# Patient Record
Sex: MALE | Race: WHITE | NOT HISPANIC OR LATINO
[De-identification: names, ages, dates, MRNs, and addresses within clinical notes are randomized per-mention and may not be internally consistent; named-entity substitution may affect disease eponyms.]

---

## 2017-01-23 ENCOUNTER — APPOINTMENT (OUTPATIENT)
Dept: DERMATOLOGY | Facility: CLINIC | Age: 72
End: 2017-01-23

## 2017-01-29 ENCOUNTER — TRANSCRIPTION ENCOUNTER (OUTPATIENT)
Age: 72
End: 2017-01-29

## 2017-12-17 ENCOUNTER — RESULT REVIEW (OUTPATIENT)
Age: 72
End: 2017-12-17

## 2017-12-18 ENCOUNTER — APPOINTMENT (OUTPATIENT)
Dept: DERMATOLOGY | Facility: CLINIC | Age: 72
End: 2017-12-18
Payer: MEDICARE

## 2017-12-18 PROCEDURE — 99213 OFFICE O/P EST LOW 20 MIN: CPT | Mod: 25

## 2017-12-18 PROCEDURE — 11100 BX SKIN SUBCUTANEOUS&/MUCOUS MEMBRANE 1 LESION: CPT

## 2018-02-16 ENCOUNTER — MEDICATION RENEWAL (OUTPATIENT)
Age: 73
End: 2018-02-16

## 2018-05-03 ENCOUNTER — RECORD ABSTRACTING (OUTPATIENT)
Age: 73
End: 2018-05-03

## 2018-05-03 DIAGNOSIS — Z85.46 PERSONAL HISTORY OF MALIGNANT NEOPLASM OF PROSTATE: ICD-10-CM

## 2018-05-07 ENCOUNTER — APPOINTMENT (OUTPATIENT)
Dept: CARDIOLOGY | Facility: CLINIC | Age: 73
End: 2018-05-07
Payer: MEDICARE

## 2018-05-07 VITALS
HEART RATE: 52 BPM | DIASTOLIC BLOOD PRESSURE: 90 MMHG | WEIGHT: 180 LBS | RESPIRATION RATE: 16 BRPM | SYSTOLIC BLOOD PRESSURE: 130 MMHG | BODY MASS INDEX: 30.73 KG/M2 | HEIGHT: 64 IN

## 2018-05-07 DIAGNOSIS — Z82.49 FAMILY HISTORY OF ISCHEMIC HEART DISEASE AND OTHER DISEASES OF THE CIRCULATORY SYSTEM: ICD-10-CM

## 2018-05-07 DIAGNOSIS — Z78.9 OTHER SPECIFIED HEALTH STATUS: ICD-10-CM

## 2018-05-07 PROCEDURE — 99214 OFFICE O/P EST MOD 30 MIN: CPT

## 2018-05-07 PROCEDURE — 93000 ELECTROCARDIOGRAM COMPLETE: CPT

## 2018-05-14 ENCOUNTER — CLINICAL ADVICE (OUTPATIENT)
Age: 73
End: 2018-05-14

## 2018-07-27 ENCOUNTER — MEDICATION RENEWAL (OUTPATIENT)
Age: 73
End: 2018-07-27

## 2018-07-30 ENCOUNTER — MEDICATION RENEWAL (OUTPATIENT)
Age: 73
End: 2018-07-30

## 2018-07-30 RX ORDER — LEVOTHYROXINE SODIUM 0.12 MG/1
125 TABLET ORAL DAILY
Qty: 90 | Refills: 3 | Status: ACTIVE | COMMUNITY
Start: 1900-01-01 | End: 1900-01-01

## 2018-08-01 ENCOUNTER — MEDICATION RENEWAL (OUTPATIENT)
Age: 73
End: 2018-08-01

## 2018-08-01 RX ORDER — ALFUZOSIN HYDROCHLORIDE 10 MG/1
10 TABLET, EXTENDED RELEASE ORAL
Qty: 270 | Refills: 3 | Status: DISCONTINUED | COMMUNITY
End: 2018-08-01

## 2018-08-07 ENCOUNTER — RX RENEWAL (OUTPATIENT)
Age: 73
End: 2018-08-07

## 2018-09-17 ENCOUNTER — APPOINTMENT (OUTPATIENT)
Dept: CARDIOLOGY | Facility: CLINIC | Age: 73
End: 2018-09-17
Payer: MEDICARE

## 2018-09-17 PROCEDURE — 78452 HT MUSCLE IMAGE SPECT MULT: CPT

## 2018-09-17 PROCEDURE — 93015 CV STRESS TEST SUPVJ I&R: CPT

## 2018-09-17 PROCEDURE — A9500: CPT

## 2018-09-17 RX ORDER — REGADENOSON 0.08 MG/ML
0.4 INJECTION, SOLUTION INTRAVENOUS
Qty: 1 | Refills: 0 | Status: COMPLETED | OUTPATIENT
Start: 2018-09-17

## 2018-09-17 RX ADMIN — REGADENOSON 0 MG/5ML: 0.08 INJECTION, SOLUTION INTRAVENOUS at 00:00

## 2018-09-18 RX ORDER — KIT FOR THE PREPARATION OF TECHNETIUM TC99M SESTAMIBI 1 MG/5ML
INJECTION, POWDER, LYOPHILIZED, FOR SOLUTION PARENTERAL
Refills: 0 | Status: COMPLETED | OUTPATIENT
Start: 2018-09-18

## 2018-09-18 RX ADMIN — KIT FOR THE PREPARATION OF TECHNETIUM TC99M SESTAMIBI 0: 1 INJECTION, POWDER, LYOPHILIZED, FOR SOLUTION PARENTERAL at 00:00

## 2018-09-24 ENCOUNTER — APPOINTMENT (OUTPATIENT)
Dept: CARDIOLOGY | Facility: CLINIC | Age: 73
End: 2018-09-24
Payer: MEDICARE

## 2018-09-24 PROCEDURE — 93880 EXTRACRANIAL BILAT STUDY: CPT

## 2018-09-24 PROCEDURE — 93306 TTE W/DOPPLER COMPLETE: CPT

## 2018-10-08 ENCOUNTER — APPOINTMENT (OUTPATIENT)
Dept: CARDIOLOGY | Facility: CLINIC | Age: 73
End: 2018-10-08
Payer: MEDICARE

## 2018-10-08 VITALS
DIASTOLIC BLOOD PRESSURE: 75 MMHG | HEIGHT: 64 IN | WEIGHT: 175 LBS | RESPIRATION RATE: 18 BRPM | HEART RATE: 60 BPM | BODY MASS INDEX: 29.88 KG/M2 | SYSTOLIC BLOOD PRESSURE: 120 MMHG

## 2018-10-08 PROCEDURE — 99214 OFFICE O/P EST MOD 30 MIN: CPT

## 2018-10-08 PROCEDURE — 93000 ELECTROCARDIOGRAM COMPLETE: CPT

## 2019-04-01 ENCOUNTER — NON-APPOINTMENT (OUTPATIENT)
Age: 74
End: 2019-04-01

## 2019-04-01 ENCOUNTER — APPOINTMENT (OUTPATIENT)
Dept: CARDIOLOGY | Facility: CLINIC | Age: 74
End: 2019-04-01
Payer: MEDICARE

## 2019-04-01 VITALS
SYSTOLIC BLOOD PRESSURE: 130 MMHG | WEIGHT: 180 LBS | RESPIRATION RATE: 16 BRPM | BODY MASS INDEX: 30.73 KG/M2 | HEART RATE: 54 BPM | DIASTOLIC BLOOD PRESSURE: 80 MMHG | HEIGHT: 64 IN

## 2019-04-01 PROCEDURE — 93000 ELECTROCARDIOGRAM COMPLETE: CPT

## 2019-04-01 PROCEDURE — 99214 OFFICE O/P EST MOD 30 MIN: CPT

## 2019-04-01 NOTE — PHYSICAL EXAM
[FreeTextEntry1] :                    Well appearing and nourished with moderate alopecia, mild obesity  no obvious deformities or distress.\par \par Eyes: \par No conjunctival injection and no xanthelasmas.\par HEENT: \par Normocephalic.Normal oral mucosa. No pallor or cyanosis\par Neck: \par No jugular venous distension. with normal A and V wave forms. No palpable adenopathy.\par Cardiovascular: \par Normal rate and rhythm with normal S1, S2 and a grade 3/6 systolic murmur. Distal arterial pulses are normal. No significant peripheral edema.\par Pulmonary: \par Lungs are clear to auscultation and percussion. Normal respiratory pattern without any accessory muscle use\par Abdomen: \par Soft, non-tender ; no palpable organomegaly or masses.\par Extremities:\par No digital clubbing, cyanosis or ischemic changes.\par Skin: \par No skin lesions, rashes, ulcers or xanthomas.\par Psychiatric: \par Alert and oriented to person, place and time. Appropriate mood and affect.

## 2019-04-01 NOTE — ASSESSMENT
[FreeTextEntry1] : ECG: Sinus at 54 beats per minute. Left anterior fascicular block. Poor R wave progression. No acute ST or T wave changes.\par \par Lab data 3/10/19:\par Cholesterol 159\par HDL 57\par LDL 78\par A1c 6.0\par Alkaline phosphatase 160\par \par Carotid study 9/20/18:\par Mild bilateral atherosclerotic plaquing. No hemodynamically significant stenosis. Left slightly worse than right.\par \par Echocardiogram 9/24/18:\par LVH with hyperdynamic left ventricular function. Ejection fraction greater than 70%.\par Mild subaortic outflow tract obstruction.\par Mild mitral and tricuspid insufficiency which appears less than on the prior echocardiogram.\par \par Exercise stress test performed with pharmacologic protocol due to bad knees:\par No significant areas of hypoperfusion or ischemia noted.\par \par \par IMPRESSION:\par 1.	Blood pressure is well controlled.\par 2.	No ischemic symptoms nor any evidence of ischemia seen on a pharmacologic stress test 4 years status post CABG. .\par 3.	Mild bilateral atherosclerotic carotid disease unchanged from prior.\par 4.             Rare clinical recurrence of paroxysmal atrial fibrillation on low dose sotalol\par 5.             Lipid control is fine, but slight increase in Alk Phos noted\par \par Plan:\par 1. Encourage continuation of her exercise and appropriate dietary restraints.\par 2. Labs in 4 months\par 3. Six-month followup is recommended\par will try to get a full set of blood work \par

## 2019-04-01 NOTE — HISTORY OF PRESENT ILLNESS
[FreeTextEntry1] : His cardiac history includes:\par 1.	CABG x3 in 2014.\par 2.	Hypertension.\par 3.	Hyperlipidemia.\par \par Further medical history includes:\par 1.	GERD.\par 2.	Prostate cancer.\par 3.	Ulcerative colitis.\par 4.	Bariatric surgery.\par 5.	The PMR.\par

## 2019-04-01 NOTE — REASON FOR VISIT
[FreeTextEntry1] :  The patient presents here for cardiac reevaluation.  His major medical problem at this time has been PMR.\par Still requiring low dose steroid therapy for this.  His leg problem has improved but he is still having some weakness and pain in his arms.    It does not impact on his activity level.  He denies any shortness of breath,  PND, orthopnea, or edema.\par \par Has very rare short episodes of PAF\par \par \par

## 2019-04-01 NOTE — REVIEW OF SYSTEMS
[see HPI] : see HPI [Negative] : Heme/Lymph [Shortness Of Breath] : no shortness of breath [Lower Ext Edema] : no extremity edema [Palpitations] : no palpitations

## 2019-06-03 ENCOUNTER — RX RENEWAL (OUTPATIENT)
Age: 74
End: 2019-06-03

## 2019-08-19 ENCOUNTER — RX RENEWAL (OUTPATIENT)
Age: 74
End: 2019-08-19

## 2019-08-22 ENCOUNTER — MEDICATION RENEWAL (OUTPATIENT)
Age: 74
End: 2019-08-22

## 2019-09-16 ENCOUNTER — APPOINTMENT (OUTPATIENT)
Dept: CARDIOLOGY | Facility: CLINIC | Age: 74
End: 2019-09-16
Payer: MEDICARE

## 2019-09-16 ENCOUNTER — NON-APPOINTMENT (OUTPATIENT)
Age: 74
End: 2019-09-16

## 2019-09-16 VITALS
DIASTOLIC BLOOD PRESSURE: 70 MMHG | WEIGHT: 178 LBS | BODY MASS INDEX: 30.39 KG/M2 | OXYGEN SATURATION: 98 % | HEIGHT: 64 IN | HEART RATE: 54 BPM | RESPIRATION RATE: 16 BRPM | SYSTOLIC BLOOD PRESSURE: 125 MMHG

## 2019-09-16 PROCEDURE — 99214 OFFICE O/P EST MOD 30 MIN: CPT

## 2019-09-16 PROCEDURE — 93000 ELECTROCARDIOGRAM COMPLETE: CPT

## 2019-09-16 NOTE — REASON FOR VISIT
[FreeTextEntry1] :  The patient presents here for cardiac reevaluation. \par \par No clinical occurrence of Afib\par \par  His major medical problem at this time has been PMR.Still requiring low dose steroid therapy for this. \par \par Complaints of frequent bruising due to AC therapy.\par \par His leg problem has improved but he is still having some weakness and pain in his arms.    It does not impact on his activity level and states he continues to be fairly active and exercise with no exertional discomfort.\par \par S/P CABG 5 years ago\par \par He denies any shortness of breath, palpitations,  PND, orthopnea, or edema.\par \par \par \par \par

## 2019-09-16 NOTE — ASSESSMENT
[FreeTextEntry1] : ECG: Sinus zoe  beats per minute. Poor R wave progression, Non specific T wave abnormalities.\par \par Lab data 6/9/2019 (ordered by Dr. Sam Lyon)\par Chol. 161\par HDL 53\par LDL 78\par \par A1C 5.9\par Creat.0.9\par ALK phos 186\par HGB 12.8\par \par \par Lab data 3/10/19:\par Cholesterol 159\par HDL 57\par LDL 78\par A1c 6.0\par Alkaline phosphatase 160\par \par Carotid study 9/20/18:\par Mild bilateral atherosclerotic plaquing. No hemodynamically significant stenosis. Left slightly worse than right.\par \par Echocardiogram 9/24/18:\par LVH with hyperdynamic left ventricular function. Ejection fraction greater than 70%.\par Mild subaortic outflow tract obstruction.\par Mild mitral and tricuspid insufficiency which appears less than on the prior echocardiogram.\par \par Exercise stress test performed with pharmacologic protocol due to bad knees:\par No significant areas of hypoperfusion or ischemia noted.\par \par \par IMPRESSION:\par 1.	Blood pressure is well controlled, today 125/70 \par 2.	No ischemic symptoms nor any evidence of ischemia seen on a pharmacologic stress test ( pharm. due                   to bad knees) 5years  status post CABG. .\par 3.	Mild bilateral atherosclerotic carotid disease unchanged from prior.\par 4.             Rare clinical recurrence of paroxysmal atrial fibrillation on low dose sotalol\par 5.             Lipid control is fine, overall goal for LDL  is < 70. Continued increase in ALK phos of 186, was 160                            in March noted.\par \par Plan:\par 1. Encourage continuation of his exercise and appropriate dietary restraints.\par 2. No changes in management or cardiac testing is needed at this time \par 3. Follow up BW in January 2020\par \par Clinical follow up in  6 months \par

## 2020-02-03 ENCOUNTER — APPOINTMENT (OUTPATIENT)
Dept: DERMATOLOGY | Facility: CLINIC | Age: 75
End: 2020-02-03
Payer: MEDICARE

## 2020-02-03 PROCEDURE — 17000 DESTRUCT PREMALG LESION: CPT

## 2020-02-03 PROCEDURE — 99213 OFFICE O/P EST LOW 20 MIN: CPT | Mod: 25

## 2020-03-13 NOTE — REVIEW OF SYSTEMS
[Shortness Of Breath] : no shortness of breath [Lower Ext Edema] : no extremity edema [Palpitations] : no palpitations [see HPI] : see HPI [Negative] : Heme/Lymph

## 2020-03-13 NOTE — ASSESSMENT
[FreeTextEntry1] : ECG: Sinus zoe  beats per minute. Poor R wave progression, Non specific T wave abnormalities.\par \par Lab Data 2020\par Chol: 161\par HDL: 60\par LDL: 72\par Tr\par A1c: 6.2\par Creat: 0.94\par \par Lab data 2019 (ordered by Dr. Sam Lyon)\par Chol. 161\par HDL 53\par LDL 78\par \par A1C 5.9\par Creat.0.9\par ALK phos 186\par HGB 12.8\par \par \par Lab data 3/10/19:\par Cholesterol 159\par HDL 57\par LDL 78\par A1c 6.0\par Alkaline phosphatase 160\par \par Carotid study 18:\par Mild bilateral atherosclerotic plaquing. No hemodynamically significant stenosis. Left slightly worse than right.\par \par Echocardiogram 18:\par LVH with hyperdynamic left ventricular function. Ejection fraction greater than 70%.\par Mild subaortic outflow tract obstruction.\par Mild mitral and tricuspid insufficiency which appears less than on the prior echocardiogram.\par \par Exercise stress test performed with pharmacologic protocol due to bad knees:\par No significant areas of hypoperfusion or ischemia noted.\par \par \par IMPRESSION:\par 1.	Blood pressure is well controlled, today 125/70 \par 2.	No ischemic symptoms nor any evidence of ischemia seen on a pharmacologic stress test ( pharm. due                   to bad knees) 5years  status post CABG. .\par 3.	Mild bilateral atherosclerotic carotid disease unchanged from prior.\par 4.             Rare clinical recurrence of paroxysmal atrial fibrillation on low dose sotalol\par 5.             Lipid control is fine, overall goal for LDL  is < 70. Continued increase in ALK phos of 186, was 160                            in March noted.\par \par Plan:\par 1. Encourage continuation of his exercise and appropriate dietary restraints.\par 2. No changes in management or cardiac testing is needed at this time \par 3. Follow up BW in 2020\par \par Clinical follow up in  6 months \par

## 2020-03-16 ENCOUNTER — APPOINTMENT (OUTPATIENT)
Dept: CARDIOLOGY | Facility: CLINIC | Age: 75
End: 2020-03-16

## 2020-08-24 ENCOUNTER — NON-APPOINTMENT (OUTPATIENT)
Age: 75
End: 2020-08-24

## 2020-08-24 ENCOUNTER — APPOINTMENT (OUTPATIENT)
Dept: CARDIOLOGY | Facility: CLINIC | Age: 75
End: 2020-08-24
Payer: MEDICARE

## 2020-08-24 VITALS
OXYGEN SATURATION: 98 % | TEMPERATURE: 98.3 F | WEIGHT: 176 LBS | SYSTOLIC BLOOD PRESSURE: 158 MMHG | HEART RATE: 53 BPM | BODY MASS INDEX: 30.05 KG/M2 | DIASTOLIC BLOOD PRESSURE: 82 MMHG | HEIGHT: 64 IN | RESPIRATION RATE: 16 BRPM

## 2020-08-24 PROCEDURE — 93000 ELECTROCARDIOGRAM COMPLETE: CPT

## 2020-08-24 PROCEDURE — 99214 OFFICE O/P EST MOD 30 MIN: CPT

## 2020-08-24 NOTE — HISTORY OF PRESENT ILLNESS
[FreeTextEntry1] : His cardiac history includes:\par 1.	CABG x  3 in 2014.\par 2.	Hypertension.\par 3.	Hyperlipidemia.\par \par Further medical history includes:\par 1.	GERD.\par 2.	Prostate cancer.\par 3.	Ulcerative colitis.\par 4.	Bariatric surgery.\par 5.	PMR.\par

## 2020-08-24 NOTE — ASSESSMENT
[FreeTextEntry1] : ECG: Sinus zoe  53 beats per minute. Poor R wave progression, Non specific T wave abnormalities.\par \par Lab data 6/9/2019 (ordered by Dr. Sam Lyon)\par Chol. 161\par HDL    53\par LDL    78\par \par A1C 5.9\par Creat.0.9\par ALK phos 186\par HGB 12.8\par \par \par Lab data 3/10/19:\par Cholesterol 159\par HDL 57\par LDL 78\par A1c 6.0\par Alkaline phosphatase 160\par \par Carotid study 9/20/18:\par Mild bilateral atherosclerotic plaquing. No hemodynamically significant stenosis. Left slightly worse than right.\par \par Echocardiogram 9/24/18:\par LVH with hyperdynamic left ventricular function. Ejection fraction greater than 70%.\par Mild subaortic outflow tract obstruction.\par Mild mitral and tricuspid insufficiency which appears less than on the prior echocardiogram.\par \par Exercise stress test performed with pharmacologic protocol due to bad knees:\par No significant areas of hypoperfusion or ischemia noted.\par \par \par IMPRESSION:\par 1.	Blood pressure is usually well controlled \par 2.	No ischemic symptoms nor any evidence of ischemia seen on a pharmacologic stress test ( pharm. due                   to bad knees) 5  years  status post CABG. .\par 3.	Mild bilateral atherosclerotic carotid disease unchanged from prior.\par 4.             Rare clinical recurrence of paroxysmal atrial fibrillation on low dose sotalol\par 5.             Lipid control is fine, overall goal for LDL  is < 70. \par \par Plan:\par 1. Will arrange for a followup carotid duplex to be done to reassess progression of disease.\par \par 2. Patient will undergo a repeat pharmacologic stress test to reassess myocardial perfusion 6 years post CABG.\par \par 3. Follow up BW in January 2020\par \par Clinical follow up in  6 months \par

## 2020-08-24 NOTE — REASON FOR VISIT
[FreeTextEntry1] :  The patient presents here for cardiac reevaluation. \par \par No clinical occurrence of Afib\par \par  His major medical problem at this time has been PMR.Still requiring low dose steroid therapy for this. \par \par Complaints of frequent bruising due to AC therapy.\par \par His leg problem has improved but he is still having some weakness and pain in his arms.    It does impact on his activity level\par \par S/P CABG 6  years ago\par \par He denies any shortness of breath, palpitations,  PND, orthopnea, or edema.\par \par Patient realizes that he's been considerably less active in recent months mostly as a result of his PMR and knee arthritis. This has consequently led to a decrease in his functional capacity.\par \par

## 2020-10-26 ENCOUNTER — APPOINTMENT (OUTPATIENT)
Dept: CARDIOLOGY | Facility: CLINIC | Age: 75
End: 2020-10-26
Payer: MEDICARE

## 2020-10-26 PROCEDURE — A9500: CPT

## 2020-10-26 PROCEDURE — 93015 CV STRESS TEST SUPVJ I&R: CPT

## 2020-10-26 PROCEDURE — 78452 HT MUSCLE IMAGE SPECT MULT: CPT

## 2020-10-26 RX ORDER — REGADENOSON 0.08 MG/ML
0.4 INJECTION, SOLUTION INTRAVENOUS
Qty: 1 | Refills: 0 | Status: COMPLETED | OUTPATIENT
Start: 2020-10-26

## 2020-10-26 RX ORDER — AMINOPHYLLINE 25 MG/ML
25 INJECTION, SOLUTION INTRAVENOUS
Qty: 0 | Refills: 0 | Status: COMPLETED | OUTPATIENT
Start: 2020-10-26

## 2020-10-26 RX ADMIN — AMINOPHYLLINE 3 MG/ML: 25 INJECTION, SOLUTION INTRAVENOUS at 00:00

## 2020-10-26 RX ADMIN — REGADENOSON 0 MG/5ML: 0.08 INJECTION, SOLUTION INTRAVENOUS at 00:00

## 2020-10-30 ENCOUNTER — APPOINTMENT (OUTPATIENT)
Dept: CARDIOLOGY | Facility: CLINIC | Age: 75
End: 2020-10-30
Payer: MEDICARE

## 2020-10-30 PROCEDURE — 93880 EXTRACRANIAL BILAT STUDY: CPT

## 2020-11-04 RX ORDER — KIT FOR THE PREPARATION OF TECHNETIUM TC99M SESTAMIBI 1 MG/5ML
INJECTION, POWDER, LYOPHILIZED, FOR SOLUTION PARENTERAL
Refills: 0 | Status: COMPLETED | OUTPATIENT
Start: 2020-11-04

## 2020-11-04 RX ADMIN — KIT FOR THE PREPARATION OF TECHNETIUM TC99M SESTAMIBI 0: 1 INJECTION, POWDER, LYOPHILIZED, FOR SOLUTION PARENTERAL at 00:00

## 2020-11-06 ENCOUNTER — APPOINTMENT (OUTPATIENT)
Dept: DERMATOLOGY | Facility: CLINIC | Age: 75
End: 2020-11-06

## 2020-12-14 ENCOUNTER — APPOINTMENT (OUTPATIENT)
Dept: CARDIOLOGY | Facility: CLINIC | Age: 75
End: 2020-12-14
Payer: MEDICARE

## 2020-12-14 ENCOUNTER — NON-APPOINTMENT (OUTPATIENT)
Age: 75
End: 2020-12-14

## 2020-12-14 VITALS
WEIGHT: 180 LBS | TEMPERATURE: 97.6 F | DIASTOLIC BLOOD PRESSURE: 82 MMHG | HEIGHT: 64 IN | HEART RATE: 56 BPM | OXYGEN SATURATION: 97 % | RESPIRATION RATE: 16 BRPM | BODY MASS INDEX: 30.73 KG/M2 | SYSTOLIC BLOOD PRESSURE: 138 MMHG

## 2020-12-14 DIAGNOSIS — K21.9 GASTRO-ESOPHAGEAL REFLUX DISEASE W/OUT ESOPHAGITIS: ICD-10-CM

## 2020-12-14 PROCEDURE — 93000 ELECTROCARDIOGRAM COMPLETE: CPT

## 2020-12-14 PROCEDURE — 99214 OFFICE O/P EST MOD 30 MIN: CPT

## 2020-12-14 NOTE — ASSESSMENT
[FreeTextEntry1] : ECG: Sinus zoe  56 beats per minute. Poor R wave progression, Non specific T wave abnormalities.\par \par \par Lab data 12/2/20\par Chol 155\par LDL   79\par HDL   47\par A1C   6.1\par Creat. 0.92\par \par Lab data 6/9/2019 (ordered by Dr. Sam Lyon)\par Chol. 161\par HDL    53\par LDL    78\par \par A1C 5.9\par Creat.0.9\par ALK phos 186\par HGB 12.8\par \par Carotid study 10/30/20\par Mild plaque in the ICA and prox. ICA\par Moderate plaque in the left ECA\par Mild plaque in the distal right CCA\par Mild plaque in the right bulb\par Mild plaque in the right prox ICA\par \par Carotid study 9/20/18:\par Mild bilateral atherosclerotic plaquing. No hemodynamically significant stenosis. Left slightly worse than right.\par \par Pharm. stress test 10/26/20\par Peal HR 68 bpm\par Peak /70\par Pcc. PACs, ECG negative for ischemia.\par Normal SPECT.\par EF 62%\par \par Echocardiogram 9/24/18:\par LVH with hyperdynamic left ventricular function. Ejection fraction greater than 70%.\par Mild subaortic outflow tract obstruction.\par Mild mitral and tricuspid insufficiency which appears less than on the prior echocardiogram.\par \par IMPRESSION:\par 1.	Blood pressure controlled. \par \par 2.	No ischemic symptoms nor any evidence of ischemia seen on a pharmacologic stress test ( pharm. due                   to bad knees) 6  years  status post CABG. .\par \par 3.	Mild bilateral atherosclerotic carotid disease unchanged from prior, no obstruction.\par \par 4.             Rare clinical recurrence of paroxysmal atrial fibrillation on low dose sotalol. ECG today NSR\par \par 5.             Lipid control is adequate, overall goal for LDL  is < 70. \par \par 6.             No cardiac symptoms at rest or with exertion. \par \par 7.             A1c trending up, now 6.1 was  5.9.\par \par Plan:\par 1.Repeat BW through his PCP.\par \par 2.Exercise has been encouraged.\par \par 3.Watch diet. \par \par 4. Meds have been refilled. \par \par Clinical follow up in  6 months \par

## 2020-12-14 NOTE — REVIEW OF SYSTEMS
[see HPI] : see HPI [Negative] : Heme/Lymph [Recent Weight Gain (___ Lbs)] : recent [unfilled] ~Ulb weight gain [Shortness Of Breath] : no shortness of breath [Lower Ext Edema] : no extremity edema [Palpitations] : no palpitations

## 2020-12-23 ENCOUNTER — RESULT CHARGE (OUTPATIENT)
Age: 75
End: 2020-12-23

## 2021-05-24 ENCOUNTER — APPOINTMENT (OUTPATIENT)
Dept: CARDIOLOGY | Facility: CLINIC | Age: 76
End: 2021-05-24
Payer: MEDICARE

## 2021-05-24 VITALS
HEART RATE: 55 BPM | BODY MASS INDEX: 30.39 KG/M2 | DIASTOLIC BLOOD PRESSURE: 70 MMHG | OXYGEN SATURATION: 97 % | WEIGHT: 178 LBS | RESPIRATION RATE: 16 BRPM | HEIGHT: 64 IN | SYSTOLIC BLOOD PRESSURE: 120 MMHG | TEMPERATURE: 97.3 F

## 2021-05-24 PROCEDURE — 93000 ELECTROCARDIOGRAM COMPLETE: CPT

## 2021-05-24 PROCEDURE — 99214 OFFICE O/P EST MOD 30 MIN: CPT

## 2021-05-24 NOTE — REASON FOR VISIT
[FreeTextEntry1] : The patient presents here for cardiac reevaluation. \par \par No recent recorded AFIB but does experience palps  on rare occasion associated with mild SOB. \par \par Mentions l/e edema from the shin down, usually  in the evening which resolves in the morning. Tries to avoid processed foods, does admit to occ. salty snacks. There is no edema on exam today.\par \par His major medical problem at this time has been PMR.Still requiring low dose steroid therapy for this. \par Does not exercise but keeps active with hunting. Limited by bad knees,\par \par S/P CABG x 3  2014\par .He denies any shortness of breath, palpitations,  PND, orthopnea, or edema.\par \par

## 2021-05-24 NOTE — ASSESSMENT
[FreeTextEntry1] : ECG: Sinus zoe  55  beats per minute. Poor R wave progression, Non specific T wave abnormalities.\par \par \par Lab data \par       12/2/20    5/15/21\par Chol 155           155\par LDL   79             86\par HDL   47            46\par A1C   6.1\par Creat. 0.92\par \par Lab data 6/9/2019 (ordered by Dr. Sam Lyon)\par Chol. 161\par HDL    53\par LDL    78\par \par A1C 5.9\par Creat.0.9\par ALK phos 186\par HGB 12.8\par \par Carotid study 10/30/20\par Mild plaque in the ICA and prox. ICA\par Moderate plaque in the left ECA\par Mild plaque in the distal right CCA\par Mild plaque in the right bulb\par Mild plaque in the right prox ICA\par \par Carotid study 9/20/18:\par Mild bilateral atherosclerotic plaquing. No hemodynamically significant stenosis. Left slightly worse than right.\par \par Pharm. stress test 10/26/20\par Peal HR 68 bpm\par Peak /70\par Pcc. PACs, ECG negative for ischemia.\par Normal SPECT.\par EF 62%\par \par Echocardiogram 9/24/18:\par LVH with hyperdynamic left ventricular function. Ejection fraction greater than 70%.\par Mild subaortic outflow tract obstruction.\par Mild mitral and tricuspid insufficiency which appears less than on the prior echocardiogram.\par \par IMPRESSION:\par 1.	Blood pressure controlled.  BMI appropriate for his age/ht/wt.\par \par 2.	No ischemic symptoms nor any evidence of ischemia seen on a pharmacologic stress test ( pharm. due                   to bad knees) 6  years  status post CABG. .\par \par 3.	Mild bilateral atherosclerotic carotid disease unchanged from prior, no obstruction.\par \par 4.             Rare clinical recurrence of palps c/w his hx of paroxysmal atrial fibrillation on low dose sotalol. ECG                 today NSR\par \par 5.             Lipid control is adequate, overall goal for LDL  is < 70. \par \par 6.             No cardiac symptoms at rest or with exertion. \par \par 7.             Last  A1c trending up and aware.\par \par 8.             No signs of HF on exam. Episodic l/e edema due to salty snacks. Currently on a very low dose CCB. \par \par Plan:\par 1.Repeat BW through his PCP.\par \par 2.Exercise has been encouraged.\par \par 3.Watch diet. Instructed to avoid Na+ containing foods for the next week to see if this resolves edema. \par \par 4. No changes to meds. \par \par Clinical follow up in  6 months \par

## 2021-05-24 NOTE — REVIEW OF SYSTEMS
[Weight Loss (___ Lbs)] : [unfilled] ~Ulb weight loss [Weight Gain (___ Lbs)] : no recent weight gain [FreeTextEntry2] : Other than as documented here and in the HPI, the thirteen point ROS is negative

## 2021-09-03 ENCOUNTER — NON-APPOINTMENT (OUTPATIENT)
Age: 76
End: 2021-09-03

## 2021-09-07 RX ORDER — AMLODIPINE BESYLATE 2.5 MG/1
2.5 TABLET ORAL TWICE DAILY
Qty: 180 | Refills: 1 | Status: DISCONTINUED | COMMUNITY
Start: 2020-08-24 | End: 2021-09-07

## 2021-09-09 ENCOUNTER — APPOINTMENT (OUTPATIENT)
Dept: CARDIOLOGY | Facility: CLINIC | Age: 76
End: 2021-09-09
Payer: MEDICARE

## 2021-09-09 VITALS
BODY MASS INDEX: 30.9 KG/M2 | WEIGHT: 181 LBS | HEIGHT: 64 IN | SYSTOLIC BLOOD PRESSURE: 192 MMHG | DIASTOLIC BLOOD PRESSURE: 110 MMHG | RESPIRATION RATE: 16 BRPM | HEART RATE: 59 BPM | OXYGEN SATURATION: 97 %

## 2021-09-09 PROCEDURE — 93000 ELECTROCARDIOGRAM COMPLETE: CPT

## 2021-09-09 PROCEDURE — 99215 OFFICE O/P EST HI 40 MIN: CPT

## 2021-09-09 NOTE — REASON FOR VISIT
[FreeTextEntry1] : The patient presents here for cardiac reevaluation regarding recent escalation of blood pressure and some reported edema of the right lower extremity.\par Patient called 9/3/2021 with complaints of burning sensation in the right lower extremity associated with some edema.  In addition, blood pressure was 170/90.\par He was instructed to cut amlodipine to 2.5 once daily.\par Remains edematous and on his own discontinued amlodipine entirely.\par Losartan 50 mg a day was added when he called back 9/7 with persistent elevation of blood pressure 182/84.\par BPs have remained markedly elevated ever since.\par Swelling may be a little better.\par He denies any change in diet or new stresses or discomfort of any sort.\par \par No recent recorded AFIB but does experience palps  on rare occasion associated with mild SOB. \par \par His major medical problem at this time has been PMR.Still requiring low dose steroid therapy for this. \par Does not exercise but keeps active with hunting. Limited by bad knees,\par \par S/P CABG x 3  2014\par .He denies any shortness of breath, palpitations,  PND, orthopnea.\par \par

## 2021-09-09 NOTE — PHYSICAL EXAM
[FreeTextEntry1] :                    Well appearing and nourished with moderate alopecia, mild obesity  no obvious deformities or distress.\par \par Eyes: \par No conjunctival injection and no xanthelasmas.\par HEENT: \par Normocephalic.Normal oral mucosa. No pallor or cyanosis\par Neck: \par No jugular venous distension. with normal A and V wave forms. No palpable adenopathy.\par Cardiovascular: \par Normal rate and rhythm with normal S1, S2 and a grade 3/6 systolic murmur. Distal arterial pulses are normal.  Trace pretibial peripheral edema.\par Pulmonary: \par Lungs are clear to auscultation and percussion. Normal respiratory pattern without any accessory muscle use\par Abdomen: \par Soft, non-tender ; no palpable organomegaly or masses.\par Extremities:\par No digital clubbing, cyanosis or ischemic changes.\par Skin: \par No skin lesions, rashes, ulcers or xanthomas.\par Psychiatric: \par Alert and oriented to person, place and time. Appropriate mood and affect.

## 2021-09-09 NOTE — REVIEW OF SYSTEMS
[Weight Gain (___ Lbs)] : [unfilled] ~Ulb weight gain [Weight Loss (___ Lbs)] : no recent weight loss [FreeTextEntry2] : Other than as documented here and in the HPI, the thirteen point ROS is negative

## 2021-09-09 NOTE — ASSESSMENT
[FreeTextEntry1] : ECG: Sinus zoe  58   beats per minute. Poor R wave progression, Non specific T wave abnormalities.\par \par \par Lab data \par       12/2/20    5/15/21\par Chol 155           155\par LDL   79             86\par HDL   47            46\par A1C   6.1\par Creat. 0.92\par \par Lab data 6/9/2019 (ordered by Dr. Sam Lyon)\par Chol. 161\par HDL    53\par LDL    78\par \par A1C 5.9\par Creat.0.9\par ALK phos 186\par HGB 12.8\par \par Carotid study 10/30/20\par Mild plaque in the ICA and prox. ICA\par Moderate plaque in the left ECA\par Mild plaque in the distal right CCA\par Mild plaque in the right bulb\par Mild plaque in the right prox ICA\par \par Carotid study 9/20/18:\par Mild bilateral atherosclerotic plaquing. No hemodynamically significant stenosis. Left slightly worse than right.\par \par Pharm. stress test 10/26/20\par Peal HR 68 bpm\par Peak /70\par Pcc. PACs, ECG negative for ischemia.\par Normal SPECT.\par EF 62%\par \par Echocardiogram 9/24/18:\par LVH with hyperdynamic left ventricular function. Ejection fraction greater than 70%.\par Mild subaortic outflow tract obstruction.\par Mild mitral and tricuspid insufficiency which appears less than on the prior echocardiogram.\par \par IMPRESSION:\par 1.	Blood pressure has become markedly uncontrolled.  Uncertain of duration.  Discontinuing amlodipine                 has not contributed significantly as he was 170 systolic before the change.\par \par 2.	No ischemic symptoms nor any evidence of ischemia seen on a pharmacologic stress test ( pharm. due                   to bad knees) 6  years  status post CABG. .\par \par 3.	Mild bilateral atherosclerotic carotid disease unchanged from prior, no obstruction.\par \par 4.             Rare clinical recurrence of palps c/w his hx of paroxysmal atrial fibrillation on low dose sotalol. ECG                 today NSR\par \par 5.             Lipid control is adequate, overall goal for LDL  is < 70. \par \par 6.             No cardiac symptoms at rest or with exertion. \par \par 7.             Last  A1c trending up and aware.\par \par 8.             Complaints of unilateral swelling heat-like pain discomfort on ambulation of several weeks duration.\par \par Plan:\par 1.lower extremity venous duplex rule out DVT\par \par 2.lower extremity arterial duplex possible claudication and PAD with a known history of atherosclerotic disease\par \par 3.Watch diet. Instructed to avoid Na+ containing foods for the next week to see if this resolves edema. \par \par 4.  Increase clonidine from 0.1-0.2 twice daily\par \par 5.  Add hydrochlorothiazide 25 mg daily and obtain a BMP in 2 weeks\par \par 6.  Echocardiogram to reassess for cardiomyopathy pulmonary hypertension or valvular disease\par Clinical follow up in  6 weeks\par

## 2021-09-16 ENCOUNTER — APPOINTMENT (OUTPATIENT)
Dept: CARDIOLOGY | Facility: CLINIC | Age: 76
End: 2021-09-16
Payer: MEDICARE

## 2021-09-16 PROCEDURE — 93306 TTE W/DOPPLER COMPLETE: CPT

## 2021-09-16 PROCEDURE — 93925 LOWER EXTREMITY STUDY: CPT

## 2021-09-23 ENCOUNTER — APPOINTMENT (OUTPATIENT)
Dept: CARDIOLOGY | Facility: CLINIC | Age: 76
End: 2021-09-23
Payer: MEDICARE

## 2021-09-23 PROCEDURE — 93970 EXTREMITY STUDY: CPT

## 2021-10-04 ENCOUNTER — APPOINTMENT (OUTPATIENT)
Dept: CARDIOLOGY | Facility: CLINIC | Age: 76
End: 2021-10-04
Payer: MEDICARE

## 2021-10-04 VITALS
BODY MASS INDEX: 30.73 KG/M2 | RESPIRATION RATE: 16 BRPM | WEIGHT: 180 LBS | DIASTOLIC BLOOD PRESSURE: 70 MMHG | HEIGHT: 64 IN | OXYGEN SATURATION: 98 % | SYSTOLIC BLOOD PRESSURE: 115 MMHG | HEART RATE: 60 BPM

## 2021-10-04 DIAGNOSIS — F41.9 ANXIETY DISORDER, UNSPECIFIED: ICD-10-CM

## 2021-10-04 PROCEDURE — 93000 ELECTROCARDIOGRAM COMPLETE: CPT

## 2021-10-04 PROCEDURE — 99214 OFFICE O/P EST MOD 30 MIN: CPT

## 2021-10-04 NOTE — REASON FOR VISIT
[FreeTextEntry1] : The patient presents here for cardiac reevaluation regarding recent escalation of blood pressure \par \par  9/3/2021 c/of burning sensation in the right lower extremity associated with some edema.  In addition, blood pressure was 170/90.\par He was instructed to cut amlodipine to 2.5 once daily.\par Remains edematous and on his own discontinued amlodipine entirely.\par He called back 9/7 with persistent elevation of blood pressure 182/84.Losartan 50 mg -  added  \par 9/9/21   Increased clonidine from 0.1-0.2 twice daily\par              Rx hydrochlorothiazide 25 mg daily \par 9/23/2021 complained of heart pounding anxiety dizziness shortness of breath.  Blood pressures averaging 129/71\par Began Lexapro 10 mg p.o. daily.\par \par Symptoms of anxiety have resolved.\par Blood pressure now controlled running less than 120/65 on average.\par .\par He denies any change in diet or new stresses or discomfort of any sort.\par \par No recent recorded AFIB but does experience palps  on rare occasion associated with mild SOB. \par \par His major medical problem at this time has been PMR.Still requiring low dose steroid therapy for this. \par Does not exercise but keeps active with hunting. Limited by bad knees,\par \par S/P CABG x 3  2014\par .He denies any shortness of breath, palpitations,  PND, orthopnea.\par \par

## 2021-10-04 NOTE — ASSESSMENT
[FreeTextEntry1] : ECG: Sinus zoe  58   beats per minute. Poor R wave progression, Non specific T wave abnormalities.\par \par \par Lab data \par 9/22/2021:\par BUN 29/creatinine 1.33 increased from 0.94 since adding losartan 50 and HCTZ 25 mg\par \par \par       12/2/20    5/15/21\par Chol 155           155\par LDL   79             86\par HDL   47            46\par A1C   6.1\par Creat. 0.92\par \par Lab data 6/9/2019 (ordered by Dr. Sam Lyon)\par Chol. 161\par HDL    53\par LDL    78\par \par A1C 5.9\par Creat.0.9\par ALK phos 186\par HGB 12.8\par \par Echocardiogram 9/16/2021:\par Normal LV size and function ejection fraction 60 to 65%\par Mild left atrial dilatation\par No significant valvular disease.\par \par Venous duplex lower extremity 9/23/2021:\par No evidence of DVT bilaterally\par \par Lower extremity arterial duplex 9/16/2021:\par No evidence of any significant obstructive PAD.\par \par Carotid study 10/30/20\par Mild plaque in the ICA and prox. ICA\par Moderate plaque in the left ECA\par Mild plaque in the distal right CCA\par Mild plaque in the right bulb\par Mild plaque in the right prox ICA\par \par Carotid study 9/20/18:\par Mild bilateral atherosclerotic plaquing. No hemodynamically significant stenosis. Left slightly worse than right.\par \par Pharm. stress test 10/26/20\par Peal HR 68 bpm\par Peak /70\par Pcc. PACs, ECG negative for ischemia.\par Normal SPECT.\par EF 62%\par \par Echocardiogram 9/24/18:\par LVH with hyperdynamic left ventricular function. Ejection fraction greater than 70%.\par Mild subaortic outflow tract obstruction.\par Mild mitral and tricuspid insufficiency which appears less than on the prior echocardiogram.\par \par IMPRESSION:\par 1.	Blood pressure is now adequately controlled with the changes which have included:\par                 Increase clonidine to 0.2 every 12\par                 Adding losartan 50 mg a day\par                 Adding hydrochlorothiazide 25 mg a day\par                 Lexapro 10 mg a day\par \par 2.	No ischemic symptoms nor any evidence of ischemia seen on a pharmacologic stress test ( pharm. due                   to bad knees) 6  years  status post CABG. .\par \par 3.	Mild bilateral atherosclerotic carotid disease unchanged from prior, no obstruction.\par                  Complaints of unilateral swelling heat-like pain discomfort on ambulation of several weeks duration.\par                  No evidence of any significant lower extremity PAD and the burning in his calf muscles has resolved.\par \par 4.             Rare clinical recurrence of palps c/w his hx of paroxysmal atrial fibrillation on low dose sotalol. \par \par 5.             Lipid control is adequate, overall goal for LDL  is < 70. \par \par 6.             Lower extremity edema has resolved and venous Doppler shows no DVT\par \par 7.             Last  A1c trending up and aware.\par \par           \par Plan:\par 1.  Continuation of all current meds for treatment of hypertension\par \par 2.  We will continue Lexapro 10 mg daily for at least the next 2 months\par \par 3.  Reassured patient about the findings on lower extremity arterial and venous duplex as well as the echocardiogram\par \par 4.  Resumption of regular exercise\par \par 5.  Clinical follow-up in 2 months

## 2021-11-29 ENCOUNTER — RX RENEWAL (OUTPATIENT)
Age: 76
End: 2021-11-29

## 2022-01-12 ENCOUNTER — RESULT CHARGE (OUTPATIENT)
Age: 77
End: 2022-01-12

## 2022-01-13 ENCOUNTER — APPOINTMENT (OUTPATIENT)
Dept: CARDIOLOGY | Facility: CLINIC | Age: 77
End: 2022-01-13
Payer: MEDICARE

## 2022-01-13 VITALS
RESPIRATION RATE: 16 BRPM | DIASTOLIC BLOOD PRESSURE: 90 MMHG | BODY MASS INDEX: 30.56 KG/M2 | WEIGHT: 179 LBS | SYSTOLIC BLOOD PRESSURE: 180 MMHG | HEART RATE: 45 BPM | HEIGHT: 64 IN | OXYGEN SATURATION: 98 %

## 2022-01-13 DIAGNOSIS — E03.9 HYPOTHYROIDISM, UNSPECIFIED: ICD-10-CM

## 2022-01-13 PROCEDURE — 93000 ELECTROCARDIOGRAM COMPLETE: CPT

## 2022-01-13 PROCEDURE — 99214 OFFICE O/P EST MOD 30 MIN: CPT

## 2022-01-13 RX ORDER — SULFASALAZINE 500 MG/1
500 TABLET, DELAYED RELEASE ORAL 3 TIMES DAILY
Qty: 270 | Refills: 0 | Status: DISCONTINUED | COMMUNITY
Start: 2018-08-01 | End: 2022-01-13

## 2022-01-13 NOTE — REASON FOR VISIT
[FreeTextEntry1] : The patient presents here for cardiac reevaluation regarding recent escalation of blood pressure \par Hypertension had been well controlled until early December.\par Noted that after AM meds of carvedilol and clonidine, he would see a sudden spike by noon to 180 systolic\par Then would take losartan 50 mg and BP would come down a bit but remain elevated greater than 150 through most of the rest of the day.  Evening would take the second carvedilol and clonidine.\par Headaches have been associated with the elevated blood pressure.\par Questions whether some of this elevation is related to the transitioning from amlodipine to losartan.\par \par \par  9/3/2021 c/of burning sensation in the right lower extremity associated with some edema.  In addition, blood pressure was 170/90.\par We reduced amlodipine to 2.5 once daily.\par Remains edematous and he discontinued amlodipine entirely.\par He called back 9/7 with persistent elevation of blood pressure 182/84\par Losartan 50 mg -  added  \par 9/9/21   Increased clonidine from 0.1-0.2 twice daily\par              Rx hydrochlorothiazide 25 mg daily \par 9/23/2021 complained of heart pounding anxiety dizziness shortness of breath.  Blood pressures averaging 129/71\par Began Lexapro 10 mg p.o. daily.\par \par Symptoms of anxiety have resolved.\par Blood pressure now controlled running less than 120/65 on average.\par .\par He denies any change in diet or new stresses or discomfort of any sort.\par \par No recent recorded AFIB but does experience palps  on rare occasion associated with mild SOB. \par \par His major medical problem at this time has been PMR.Still requiring low dose steroid therapy for this. \par Does not exercise but keeps active with hunting. Limited by bad knees,\par \par S/P CABG x 3  2014\par .He denies any shortness of breath, palpitations,  PND, orthopnea.\par \par

## 2022-01-13 NOTE — ASSESSMENT
[FreeTextEntry1] : ECG: Sinus zoe  45   beats per minute. Poor R wave progression, Non specific T wave abnormalities.\par \par \par Lab data \par 9/22/2021:\par BUN 29/creatinine 1.33 increased from 0.94 since adding losartan 50 and HCTZ 25 mg\par \par \par ----12/2/20---5/15/21---1/4/22\par Chol--155-----155-------147\par LDL---79-------86--------80\par HDL---47------46---------44\par A1C--6.1\par Creat.0.92---------------1.12\par \par Lab data 6/9/2019 (ordered by Dr. Sam Lyon)\par Chol. 161\par HDL    53\par LDL    78\par \par A1C 5.9\par Creat.0.9\par ALK phos 186\par HGB 12.8\par \par Echocardiogram 9/16/2021:\par Normal LV size and function ejection fraction 60 to 65%\par Mild left atrial dilatation\par No significant valvular disease.\par \par Venous duplex lower extremity 9/23/2021:\par No evidence of DVT bilaterally\par \par Lower extremity arterial duplex 9/16/2021:\par No evidence of any significant obstructive PAD.\par \par Carotid study 10/30/20\par Mild plaque in the ICA and prox. ICA\par Moderate plaque in the left ECA\par Mild plaque in the distal right CCA\par Mild plaque in the right bulb\par Mild plaque in the right prox ICA\par \par Carotid study 9/20/18:\par Mild bilateral atherosclerotic plaquing. No hemodynamically significant stenosis. Left slightly worse than right.\par \par Pharm. stress test 10/26/20\par Peal HR 68 bpm\par Peak /70\par Pcc. PACs, ECG negative for ischemia.\par Normal SPECT.\par EF 62%\par \par Echocardiogram 9/24/18:\par LVH with hyperdynamic left ventricular function. Ejection fraction greater than 70%.\par Mild subaortic outflow tract obstruction.\par Mild mitral and tricuspid insufficiency which appears less than on the prior echocardiogram.\par \par IMPRESSION:\par 1.	Blood pressure is once again escalating.  Cause unclear.\par                 He responds to the early morning meds but then rapidly becomes escalated again.\par                  Suggest that he has a transient response to clonidine which dissipates quickly within a few hours\par                 Noon dosing of losartan is mildly effective\par \par 2.	No ischemic symptoms nor any evidence of ischemia seen on a pharmacologic stress test ( pharm. due                   to bad knees) 6  years  status post CABG. .\par \par 3.	Mild bilateral atherosclerotic carotid disease unchanged from prior, no obstruction.\par                  Complaints of unilateral swelling heat-like pain discomfort on ambulation of several weeks duration.\par                  No evidence of any significant lower extremity PAD and the burning in his calf muscles has resolved.\par \par 4.             Rare clinical recurrence of palps c/w his hx of paroxysmal atrial fibrillation on low dose sotalol. \par \par 5.             Lipid control is adequate, overall goal for LDL  is < 70. \par \par 6.             Lower extremity edema has resolved and venous Doppler shows no DVT\par \par 7.             Last  A1c trending up and aware.\par \par           \par Plan:\par 1.  We will increase the noon dose of losartan from 50 to 100 mg\par      Will add a third dose of clonidine.  Dose #2 will be at 2 PM and dose #3 (with carvedilol) will be at 8 PM\par      Patient to contact me in 2 weeks with blood pressure range\par \par 2.  We will continue Lexapro 10 mg daily for at least the next 2 months\par \par 3.  Reassured patient about the findings on lower extremity arterial and venous duplex as well as the       echocardiogram\par \par 4.  Resumption of regular exercise\par \par 5.  Clinical follow-up in 2 months

## 2022-02-14 ENCOUNTER — APPOINTMENT (OUTPATIENT)
Dept: CARDIOLOGY | Facility: CLINIC | Age: 77
End: 2022-02-14
Payer: MEDICARE

## 2022-02-14 ENCOUNTER — APPOINTMENT (OUTPATIENT)
Dept: CARDIOLOGY | Facility: CLINIC | Age: 77
End: 2022-02-14

## 2022-02-14 VITALS — DIASTOLIC BLOOD PRESSURE: 100 MMHG | SYSTOLIC BLOOD PRESSURE: 190 MMHG | HEART RATE: 64 BPM | OXYGEN SATURATION: 97 %

## 2022-02-14 PROCEDURE — 99214 OFFICE O/P EST MOD 30 MIN: CPT

## 2022-02-14 PROCEDURE — 93000 ELECTROCARDIOGRAM COMPLETE: CPT

## 2022-02-14 RX ORDER — LOSARTAN POTASSIUM 100 MG/1
100 TABLET, FILM COATED ORAL DAILY
Qty: 90 | Refills: 3 | Status: DISCONTINUED | COMMUNITY
Start: 2021-09-07 | End: 2022-02-14

## 2022-02-14 NOTE — REASON FOR VISIT
[FreeTextEntry1] : The patient presents here for an  emergent OV found to have persistently elevated BP readings \par \par During his LOV we made the following adjustments:\par 1. Losartan 50 mg was increased to 100 mg - taken at noon\par 2. Clonidine 0.2 mg  increased to TID- 2nd dose to be taken at  2pm. Dose #3 to be taken at night with Carvedilol.\par \par Personal machine calibrated found to be accurate\par Manual right= 180/110\par Machine right= 181/118\par \par Admits that he is now having mild headaches. He denies any change in diet or new stresses or discomfort of any sort. No edema on exam to suggest HF or excess Na+ intake.\par \par Admits that he stopped his HCTZ on his own a few months ago when he l/e edema resolved \par \par He denies any shortness of breath, palpitations,  PND, orthopnea.\par \par \par HTN Hx:\par -Hypertension had been well controlled until early December.\par -Noted that after AM meds of carvedilol and clonidine, he would see a sudden spike by noon to 180 systolic\par Then would take losartan 50 mg and BP would come down a bit but remain elevated greater than 150 through most of the rest of the day.  Evening would take the second carvedilol and clonidine.\par \par 9/3/2021 c/of burning sensation in the right lower extremity associated with some edema.  In addition, blood pressure was 170/90.\par We reduced amlodipine to 2.5 once daily.\par Remained edematous and he discontinued amlodipine entirely.\par He called back 9/7 with persistent elevation of blood pressure 182/84\par Losartan 50 mg -  added  \par 9/9/21   Increased clonidine from 0.1-0.2 twice daily\par              Rx hydrochlorothiazide 25 mg daily \par 9/23/2021 complained of heart pounding anxiety dizziness shortness of breath.  Blood pressures averaging 129/71\par Began Lexapro 10 mg p.o. daily.\par \par 1/13/22-1. Losartan 50 mg was increased to 100 mg - taken at noon\par               2. Clonidine increased to TID- 2nd dose to be taken at  2pm. Dose #3 to be taken at night with Carvedilol.\par                3. No longer on HCTZ \par \par \par .No recent recorded AFIB but does experience palps  on rare occasion associated with mild SOB. \par \par His major medical problem at this time has been PMR.Still requiring low dose steroid therapy for this, Prednisone\par   \par Does not exercise but keeps active with hunting. Limited by bad knees,\par \par \par \par \par

## 2022-02-14 NOTE — ASSESSMENT
[FreeTextEntry1] : ECG: No ECG obtained today \par \par ----12/2/20---5/15/21---1/4/22\par Chol--155-----155-------147\par LDL---79-------86--------80\par HDL---47------46---------44\par A1C--6.1\par Creat.0.92---------------1.12\par \par Lab data \par 9/22/2021:\par BUN 29/creatinine 1.33 increased from 0.94 since adding losartan 50 and HCTZ 25 mg\par \par Echocardiogram 9/16/2021:\par Normal LV size and function ejection fraction 60 to 65%\par Mild left atrial dilatation\par No significant valvular disease.\par \par Venous duplex lower extremity 9/23/2021:\par No evidence of DVT bilaterally\par \par Lower extremity arterial duplex 9/16/2021:\par No evidence of any significant obstructive PAD.\par \par Carotid study 10/30/20\par Mild plaque in the ICA and prox. ICA\par Moderate plaque in the left ECA\par Mild plaque in the distal right CCA\par Mild plaque in the right bulb\par Mild plaque in the right prox ICA\par \par Carotid study 9/20/18:\par Mild bilateral atherosclerotic plaquing. No hemodynamically significant stenosis. Left slightly worse than right.\par \par Pharm. stress test 10/26/20\par Peal HR 68 bpm\par Peak /70\par Pcc. PACs, ECG negative for ischemia.\par Normal SPECT.\par EF 62%\par \par Echocardiogram 9/24/18:\par LVH with hyperdynamic left ventricular function. Ejection fraction greater than 70%.\par Mild subaortic outflow tract obstruction.\par Mild mitral and tricuspid insufficiency which appears less than on the prior echocardiogram.\par \par IMPRESSION:\par 1.	 Despite several adjustments made recently his BP continues to escalate. Cause unclear, doubt it his low                   dose steroid.\par                 - He responds to the early morning meds but then rapidly becomes elevated in the PM.\par                 - Suggest that he has a transient response to clonidine which dissipates quickly within a few hours\par                 -  Increased noon dosing of losartan is mildly effective.\par                 - Mild headaches when elevated\par                 -Personal machine found to be accurate \par                 -Stopped diuretic on his own\par     There is a possibility that his sleep apnea has progressed contributing to his readings.  He stopped using CPAP many years ago after his weight of \par we also can not exclude renal artery stenosis or other causes of secondary hypertension. \par \par 2.	No ischemic symptoms nor any evidence of ischemia seen on a pharmacologic stress test ( pharm. due                   to bad knees) 6  years  status post CABG. .\par \par 3.	Mild bilateral atherosclerotic carotid disease unchanged from prior, no obstruction.\par                  Complaints of unilateral swelling heat-like pain discomfort on ambulation of several weeks duration.\par                  No evidence of any significant lower extremity PAD and the burning in his calf muscles has resolved.\par \par 4.             Rare clinical recurrence of palps c/w his hx of paroxysmal atrial fibrillation on low dose sotalol. \par \par 5.             Lipid control is adequate, overall goal for LDL  is < 70. \par \par 6.             Lower extremity edema has resolved and venous Doppler shows no DVT\par \par 7.             Last  A1c trending up and aware.\par \par           \par Plan:\par 1.  C/W Losartan 100 mg QD at noon\par      Increase Clonidine to 0.3 mg Dose #2 will be at 2 PM and dose #3 (with carvedilol) will be at 8 PM\par      Restart HCTZ 25 mg QD with repeat BW due in 2 weeks to reassess renal fxn.\par      Start Nifedipine ER 60 mg QD\par      C/W Carvedilol an Sotalol as is. \par \par 2.  We will continue Lexapro 10 mg daily.\par \par 3.  Renal doppler to r/o renovascular hypertension. \par \par 4.  HST to reassess status of his sleep apnea.\par \par 5.  Laboratory data to include plasma metanephrines serum renin and aldosterone\par Clinical Follow up in 2 weeks.

## 2022-02-28 ENCOUNTER — APPOINTMENT (OUTPATIENT)
Dept: CARDIOLOGY | Facility: CLINIC | Age: 77
End: 2022-02-28
Payer: MEDICARE

## 2022-02-28 VITALS
HEIGHT: 64 IN | BODY MASS INDEX: 30.73 KG/M2 | WEIGHT: 180 LBS | DIASTOLIC BLOOD PRESSURE: 78 MMHG | SYSTOLIC BLOOD PRESSURE: 118 MMHG | OXYGEN SATURATION: 98 % | RESPIRATION RATE: 16 BRPM | HEART RATE: 54 BPM

## 2022-02-28 PROCEDURE — 99214 OFFICE O/P EST MOD 30 MIN: CPT

## 2022-02-28 RX ORDER — CLOTRIMAZOLE 10 MG/ML
1 SOLUTION TOPICAL
Qty: 37 | Refills: 0 | Status: DISCONTINUED | COMMUNITY
Start: 2022-02-24

## 2022-02-28 RX ORDER — MOMETASONE FUROATE 1 MG/ML
0.1 SOLUTION TOPICAL
Qty: 90 | Refills: 0 | Status: DISCONTINUED | COMMUNITY
Start: 2020-11-23

## 2022-02-28 NOTE — REASON FOR VISIT
[FreeTextEntry1] : The patient presents here for reevaluation after we changed his medical regimen 2 weeks ago.  He had presented at that time with accelerated blood pressure readings.\par \par Previously 1/13/2022, we made the following adjustments:\par 1. Losartan 50 mg was increased to 100 mg - taken at noon\par 2. Clonidine 0.2 mg  increased to TID- 2nd dose to be taken at  2pm. Dose #3 to be taken at night with Carvedilol.\par \par On 2/14/2022,\par      Increased Clonidine to 0.3 mg;  Dose #2  at 2 PM and dose #3 (with carvedilol) at 8 PM\par      Restarted HCTZ 25 mg QD\par      Started Nifedipine ER 60 mg QD\par We also arranged the timing to address when his blood pressure would surge.\par \par He presents here citing better control and it seems a good response to nifedipine. That only last about 7 hours though and blood pressure seems to start surgeon again by 3:30 in the afternoon.\par \par Personal machine calibrated found to be accurate\par \par When the blood pressure is high, he does get mild headaches. He denies any change in diet or new stresses or discomfort of any sort. No edema on exam to suggest HF or excess Na+ intake.\par \par He denies any shortness of breath, palpitations,  PND, orthopnea.\par \par HTN Hx:\par -Hypertension had been well controlled until early December.2021\par -Noted that after AM meds of carvedilol and clonidine, he would see a sudden spike by noon to 180 systolic\par Then would take losartan 50 mg and BP would come down a bit but remain elevated greater than 150 through most of the rest of the day.  Evening would take the second carvedilol and clonidine.\par \par 9/3/2021 c/of burning sensation in the right lower extremity associated with some edema.  In addition, blood pressure was 170/90.\par We reduced amlodipine to 2.5 once daily.\par Remained edematous and he discontinued amlodipine entirely.\par He called back 9/7 with persistent elevation of blood pressure 182/84\par Losartan 50 mg -  added  \par \par 9/9/21   Increased clonidine from 0.1-0.2 twice daily\par              Rx hydrochlorothiazide 25 mg daily \par \par 9/23/2021 complained of heart pounding anxiety dizziness shortness of breath.  Blood pressures averaging 129/71\par Began Lexapro 10 mg p.o. daily.\par \par No recent recorded AFIB but does experience palps  on rare occasion associated with mild SOB. \par \par His major medical problem at this time has been PMR.Still requiring low dose steroid therapy for this, Prednisone\par   \par Does not exercise but keeps active with hunting. Limited by bad knees,\par \par \par \par \par

## 2022-02-28 NOTE — ASSESSMENT
[FreeTextEntry1] : ECG: No ECG obtained today \par \par ----12/2/20---5/15/21---1/4/22\par Chol--155-----155-------147\par LDL---79-------86--------80\par HDL---47------46---------44\par A1C--6.1\par Creat.0.92---------------1.12\par \par Lab data \par 9/22/2021:\par BUN 29/creatinine 1.33 increased from 0.94 since adding losartan 50 and HCTZ 25 mg\par \par \par Echocardiogram 9/16/2021:\par Normal LV size and function ejection fraction 60 to 65%\par Mild left atrial dilatation\par No significant valvular disease.\par \par Venous duplex lower extremity 9/23/2021:\par No evidence of DVT bilaterally\par \par 2/24/2022: Renal artery Doppler\par No evidence of renal artery stenosis \par \par extremity arterial duplex 9/16/2021:\par No evidence of any significant obstructive PAD.\par \par Carotid study 10/30/20\par Mild plaque in the ICA and prox. ICA\par Moderate plaque in the left ECA\par Mild plaque in the distal right CCA\par Mild plaque in the right bulb\par Mild plaque in the right prox ICA\par \par Carotid study 9/20/18:\par Mild bilateral atherosclerotic plaquing. No hemodynamically significant stenosis. Left slightly worse than right.\par \par Pharm. stress test 10/26/20\par Peal HR 68 bpm\par Peak /70\par Pcc. PACs, ECG negative for ischemia.\par Normal SPECT.\par EF 62%\par \par Echocardiogram 9/24/18:\par LVH with hyperdynamic left ventricular function. Ejection fraction greater than 70%.\par Mild subaortic outflow tract obstruction.\par Mild mitral and tricuspid insufficiency which appears less than on the prior echocardiogram.\par \par IMPRESSION:\par 1.	Reasonable response to nifedipine 60 mg a day.\par                 - He responds to the early morning meds but then rapidly becomes elevated about 3:30 PM\par                 - He has a transient response to clonidine which dissipates quickly within a few hours\par                 -  Increased noon dosing of losartan is mildly effective.\par                 - Mild headaches when elevated\par                 -Personal machine found to be accurate \par                \par                      There is a possibility that his sleep apnea has progressed contributing to his readings.  He stopped using CPAP many years ago after his weight of \par \par 2.  Renal artery stenosis seems to be safely excluded.\par \par 3.	No ischemic symptoms nor any evidence of ischemia seen on a pharmacologic stress test ( pharm. due                   to bad knees) 6  years  status post CABG. .\par \par 4.	Mild bilateral atherosclerotic carotid disease unchanged from prior, no obstruction.\par                  Complaints of unilateral swelling heat-like pain discomfort on ambulation of several weeks duration.\par                  No evidence of any significant lower extremity PAD and the burning in his calf muscles has resolved.\par \par 5.             Rare clinical recurrence of palps c/w his hx of paroxysmal atrial fibrillation on low dose sotalol. \par \par 6.             Lipid control is adequate, overall goal for LDL  is < 70. \par \par 7.             Lower extremity edema has resolved and venous Doppler shows no DVT\par \par           \par Plan:\par 1.  Change nifedipine to twice daily with second dose given at about 3:30 PM\par \par 2.  We will continue Lexapro 10 mg daily.\par \par 3.  C/W Losartan 100 mg QD at noon\par     \par      C/W Carvedilol an Sotalol as is. \par \par 4.  HST to reassess status of his sleep apnea.\par \par 5.  Laboratory data to include plasma metanephrines serum renin and aldosterone\par \par 6.  Patient to call with blood pressure readings and average in about 3 to 4 weeks.

## 2022-03-15 ENCOUNTER — APPOINTMENT (OUTPATIENT)
Dept: CARDIOLOGY | Facility: CLINIC | Age: 77
End: 2022-03-15
Payer: MEDICARE

## 2022-03-15 VITALS
BODY MASS INDEX: 30.73 KG/M2 | DIASTOLIC BLOOD PRESSURE: 65 MMHG | HEIGHT: 64 IN | HEART RATE: 52 BPM | WEIGHT: 180 LBS | SYSTOLIC BLOOD PRESSURE: 110 MMHG | OXYGEN SATURATION: 98 % | RESPIRATION RATE: 16 BRPM

## 2022-03-15 PROCEDURE — 99214 OFFICE O/P EST MOD 30 MIN: CPT

## 2022-03-15 PROCEDURE — 93000 ELECTROCARDIOGRAM COMPLETE: CPT

## 2022-03-15 NOTE — REASON FOR VISIT
[FreeTextEntry1] : The patient presents here for reevaluation of accelerated hypertension difficult to control over the last few months\par \par Previously 1/13/2022, we made the following adjustments:\par 1. Losartan 50 mg was increased to 100 mg - taken at noon\par 2. Clonidine 0.2 mg  increased to TID- 2nd dose to be taken at  2pm. Dose #3 to be taken at night with Carvedilol.\par \par On 2/14/2022,\par      Increased Clonidine to 0.3 mg;  Dose #2  at 2 PM and dose #3 (with carvedilol) at 8 PM\par      Restarted HCTZ 25 mg QD\par      Started Nifedipine ER 60 mg QD\par We also arranged the timing to address when his blood pressure would surge.\par \par The a.m. dose of nifedipine would only last about 7 hours though and blood pressure seems to start increasing again by 3:30 in the afternoon.\par Consequently, 2/28/2020 2 weeks change nifedipine to twice daily\par \par Personal machine calibrated found to be accurate\par \par When the blood pressure is high, he does get mild headaches. He denies any change in diet or new stresses or discomfort of any sort. No edema on exam to suggest HF or excess Na+ intake.\par \par He denies any shortness of breath, palpitations,  PND, orthopnea.\par \par HTN Hx:\par -Hypertension had been well controlled until early December.2021\par -Noted that after AM meds of carvedilol and clonidine, he would see a sudden spike by noon to 180 systolic\par Then would take losartan 50 mg and BP would come down a bit but remain elevated greater than 150 through most of the rest of the day.  Evening would take the second carvedilol and clonidine.\par \par 9/3/2021 c/of burning sensation in the right lower extremity associated with some edema.  In addition, blood pressure was 170/90.\par We reduced amlodipine to 2.5 once daily.\par Remained edematous and he discontinued amlodipine entirely.\par He called back 9/7 with persistent elevation of blood pressure 182/84\par Losartan 50 mg -  added  \par \par 9/9/21   Increased clonidine from 0.1-0.2 twice daily\par              Rx hydrochlorothiazide 25 mg daily \par \par 9/23/2021 complained of heart pounding anxiety dizziness shortness of breath.  Blood pressures averaging 129/71\par Began Lexapro 10 mg p.o. daily.\par \par No recent recorded AFIB but does experience palps  on rare occasion associated with mild SOB. \par \par His major medical problem at this time has been PMR.Still requiring low dose steroid therapy for this, Prednisone\par   \par Does not exercise but keeps active with hunting. Limited by bad knees,\par \par \par \par \par

## 2022-03-15 NOTE — ASSESSMENT
[FreeTextEntry1] : ----12/2/20---5/15/21---1/4/22--2/24/22\par Chol--155-----155-------147------\par LDL---79-------86--------80\par HDL---47------46---------44\par A1C--6.1\par Creat.0.92---------------1.12-----1.29\par \par \par 2/24/2022:\par Aldosterone, metanephrines, normetanephrine's, plasma renin… All within normal limits\par \par Lab data \par 9/22/2021:\par BUN 29/creatinine 1.33 increased from 0.94 since adding losartan 50 and HCTZ 25 mg\par \par Renal artery duplex 2/24/2022:\par There is no evidence of renal artery stenosis.\par \par \par Echocardiogram 9/16/2021:\par Normal LV size and function ejection fraction 60 to 65%\par Mild left atrial dilatation\par No significant valvular disease.\par \par Venous duplex lower extremity 9/23/2021:\par No evidence of DVT bilaterally\par \par 2/24/2022: Renal artery Doppler\par No evidence of renal artery stenosis \par \par extremity arterial duplex 9/16/2021:\par No evidence of any significant obstructive PAD.\par \par Carotid study 10/30/20\par Mild plaque in the ICA and prox. ICA\par Moderate plaque in the left ECA\par Mild plaque in the distal right CCA\par Mild plaque in the right bulb\par Mild plaque in the right prox ICA\par \par Carotid study 9/20/18:\par Mild bilateral atherosclerotic plaquing. No hemodynamically significant stenosis. Left slightly worse than right.\par \par Pharm. stress test 10/26/20\par Peal HR 68 bpm\par Peak /70\par Pcc. PACs, ECG negative for ischemia.\par Normal SPECT.\par EF 62%\par \par Echocardiogram 9/24/18:\par LVH with hyperdynamic left ventricular function. Ejection fraction greater than 70%.\par Mild subaortic outflow tract obstruction.\par Mild mitral and tricuspid insufficiency which appears less than on the prior echocardiogram.\par \par IMPRESSION:\par 1.	Hypertension, seems well controlled with the increase in nifedipine twice daily\par                 A.m. dose nifedipine only last about 7 hours.\par                 - He has a transient response to clonidine which dissipates quickly within a few hours\par                 -  Increased noon dosing of losartan is mildly effective.\par                 - Mild headaches when elevated\par                 -Personal machine found to be accurate \par                \par                      There is a possibility that his sleep apnea has progressed contributing to his readings.  He stopped                      using CPAP many years ago after his weight of \par \par 2.              Renal artery stenosis seems to be safely excluded.\par                  Other causes of secondary hypertension seems to be excluded based on the above blood work\par \par 3.	No ischemic symptoms nor any evidence of ischemia seen on a pharmacologic stress test ( pharm. due                   to bad knees) 6  years  status post CABG. .\par \par 4.	Mild bilateral atherosclerotic carotid disease unchanged from prior, no obstruction.\par                  Complaints of unilateral swelling heat-like pain discomfort on ambulation of several weeks duration.\par                  No evidence of any significant lower extremity PAD and the burning in his calf muscles has resolved.\par \par 5.             Rare clinical recurrence of palps c/w his hx of paroxysmal atrial fibrillation on low dose sotalol. \par \par 6.             Lipid control is adequate, overall goal for LDL  is < 70. \par \par 7.             Lower extremity edema has resolved and venous Doppler shows no DVT\par \par           \par Plan:\par 1.  Continue all of current medical regimen and timing as it is\par \par 2.  We will continue Lexapro 10 mg daily.\par \par 3.   HST to reassess status of his sleep apnea.\par \par 4.  Patient to continue to monitor blood pressure and contact him with regard to any sick sustained elevation.  Otherwise follow-up in about 3 months

## 2022-03-31 ENCOUNTER — APPOINTMENT (OUTPATIENT)
Age: 77
End: 2022-03-31
Payer: MEDICARE

## 2022-03-31 ENCOUNTER — OUTPATIENT (OUTPATIENT)
Dept: OUTPATIENT SERVICES | Facility: HOSPITAL | Age: 77
LOS: 1 days | End: 2022-03-31
Payer: MEDICARE

## 2022-03-31 DIAGNOSIS — G47.33 OBSTRUCTIVE SLEEP APNEA (ADULT) (PEDIATRIC): ICD-10-CM

## 2022-03-31 PROCEDURE — 95806 SLEEP STUDY UNATT&RESP EFFT: CPT

## 2022-03-31 PROCEDURE — 95806 SLEEP STUDY UNATT&RESP EFFT: CPT | Mod: 26

## 2022-06-09 ENCOUNTER — RX RENEWAL (OUTPATIENT)
Age: 77
End: 2022-06-09

## 2022-06-30 ENCOUNTER — APPOINTMENT (OUTPATIENT)
Dept: CARDIOLOGY | Facility: CLINIC | Age: 77
End: 2022-06-30

## 2022-06-30 VITALS
RESPIRATION RATE: 16 BRPM | BODY MASS INDEX: 29.88 KG/M2 | SYSTOLIC BLOOD PRESSURE: 110 MMHG | HEART RATE: 42 BPM | HEIGHT: 64 IN | WEIGHT: 175 LBS | DIASTOLIC BLOOD PRESSURE: 70 MMHG | OXYGEN SATURATION: 98 %

## 2022-06-30 PROCEDURE — 93000 ELECTROCARDIOGRAM COMPLETE: CPT

## 2022-06-30 PROCEDURE — 99214 OFFICE O/P EST MOD 30 MIN: CPT

## 2022-06-30 RX ORDER — LOSARTAN POTASSIUM 100 MG/1
100 TABLET, FILM COATED ORAL
Qty: 90 | Refills: 3 | Status: DISCONTINUED | COMMUNITY
Start: 2021-09-07 | End: 2022-06-30

## 2022-06-30 NOTE — HISTORY OF PRESENT ILLNESS
[FreeTextEntry1] : His cardiac history includes:\par 1.	CABG x  3 in 2014.\par 2.	Hypertension.\par 3.	Hyperlipidemia.\par \par Further medical history includes:\par 1.	GERD.\par 2.	Prostate cancer.\par 3.	Ulcerative colitis.\par 4.	Bariatric surgery.\par 5.	PMR (requiring low dose steroid therapy: Prednisone)\par \par Pt reports feeling well today. Checking his blood pressure regularly. He felt that Losartan was not helping him and stopped taking it on his own. Home blood pressure with the discontinuation of Losartan averaging 110/67. Pt denies any headaches or edema. \par \par No recent recorded Afib but does report occasional mild palpitations with short duration and resolving without intervention. Not associated with shortness of breath as it was in the past. \par \par Making more efforts to stay active, still working. Walks frequently for exercise. He denies any exertional chest pain or palpitations. Has chronic shortness of breath with exertion that has remained unchanged for at least a year. States it is mild and resolves after a short period of time. \par \par Nutritionally, he is eating smaller portions, avoiding salts and carbohydrates.

## 2022-06-30 NOTE — ASSESSMENT
[FreeTextEntry1] : EKG: Sinus bradycardia at 42 bpm. 1st degree AVB, T wave flattening in lateral leads as seen on previous EKG. \par \par LAB DATA\par \par ----12/2/20---5/15/21---1/4/22--2/24/22\par Chol--155-----155-------147------NA\par LDL---79-------86--------80-------NA\par HDL---47------46---------44-------NA\par A1C--6.1\par Creat.0.92---------------1.12-----1.29\par \par 2/24/2022:\par Aldosterone, metanephrines, normetanephrine, plasma renin all within normal limits\par \par Lab data \par 9/22/2021:\par BUN 29/creatinine 1.33 increased from 0.94 since adding losartan 50 and HCTZ 25 mg\par \par Portable Monitor Sleep Study: (3/31/22)\par MURALI (surrogate for AHI): 16.8\par OAI 2.5\par ANGELINA 0.8\par Lowest Desat 73\par \par Renal artery duplex 2/24/2022:\par There is no evidence of renal artery stenosis.\par \par Echocardiogram 9/16/2021:\par Normal LV size and function ejection fraction 60 to 65%\par Mild left atrial dilatation\par No significant valvular disease.\par \par Venous duplex lower extremity 9/23/2021:\par No evidence of DVT bilaterally\par \par 2/24/2022: Renal artery Doppler\par No evidence of renal artery stenosis \par \par extremity arterial duplex 9/16/2021:\par No evidence of any significant obstructive PAD.\par \par Carotid study 10/30/20\par Mild plaque in the ICA and prox. ICA\par Moderate plaque in the left ECA\par Mild plaque in the distal right CCA\par Mild plaque in the right bulb\par Mild plaque in the right prox ICA\par \par Carotid study 9/20/18:\par Mild bilateral atherosclerotic plaquing. No hemodynamically significant stenosis. Left slightly worse than right.\par \par Pharm. stress test 10/26/20\par Peal HR 68 bpm\par Peak /70\par Pcc. PACs, ECG negative for ischemia.\par Normal SPECT.\par EF 62%\par \par Echocardiogram 9/24/18:\par LVH with hyperdynamic left ventricular function. Ejection fraction greater than 70%.\par Mild subaortic outflow tract obstruction.\par Mild mitral and tricuspid insufficiency which appears less than on the prior echocardiogram.\par \par IMPRESSION:\par 1.	Hypertension, seems well controlled on current antihypertensives and off Losartan.           \par                   There is a possibility that his sleep apnea has progressed contributing to his readings.  He stopped                      using CPAP many years ago after his weight loss. \par \par 2.              Renal artery stenosis seems to be safely excluded.\par                  Other causes of secondary hypertension seems to be excluded based on the above blood work\par \par 3.	No ischemic symptoms nor any evidence of ischemia seen on a pharmacologic stress test ( pharm. due                   to bad knees) 6  years  status post CABG. .\par \par 4.	Mild bilateral atherosclerotic carotid disease unchanged from prior, no obstruction.\par                  Complaints of unilateral swelling heat-like pain discomfort on ambulation of several weeks duration.\par                  No evidence of any significant lower extremity PAD and the burning in his calf muscles has resolved.\par \par 5.             Rare clinical recurrence of palps c/w his hx of paroxysmal atrial fibrillation on low dose sotalol. \par \par 6.             Lipid control is adequate, overall goal for LDL  is < 70. He continues to follow a strict diet. \par \par 7.             Lower extremity edema has resolved and venous Doppler shows no DVT. \par \par 8.           EKG today shows sinus bradycardia likely related to Carvedilol and Clonidine. \par \par           \par Plan:\par 1.  Decrease Carvedilol to 3.125 twice a day in light of bradycardia. Continue other antihypertensives at current doses. \par \par 2.  We will continue Lexapro 10 mg daily.\par \par 3.  Follow-up on sleep study referral to treat moderate KEERHTI. \par \par 4.  Patient to continue to monitor blood pressure and contact him with regard to any sick sustained elevation. \par \par \par Pt knows to call if any new or worsening symptoms. In the absence of any cardiac associated symptoms clinical follow up can be made in 3 months. \par

## 2022-06-30 NOTE — REASON FOR VISIT
[FreeTextEntry1] : The patient presents here for reevaluation of management of accelerated hypertension difficult to control over the last few months. Multiple adjustments made to his antihypertensives as follows:\par \par 1/13/2022, we made the following adjustments:\par 1. Losartan 50 mg was increased to 100 mg - taken at noon\par 2. Clonidine 0.2 mg  increased to TID- 2nd dose to be taken at  2pm. Dose #3 to be taken at night with Carvedilol.\par \par On 2/14/2022,\par      Increased Clonidine to 0.3 mg;  Dose #2  at 2 PM and dose #3 (with carvedilol) at 8 PM\par      Restarted HCTZ 25 mg QD\par      Started Nifedipine ER 60 mg QD\par We also arranged the timing to address when his blood pressure would surge.\par \par The a.m. dose of nifedipine would only last about 7 hours though and blood pressure seems to start increasing again by 3:30 in the afternoon.\par \par  2/28/2020 2 weeks change nifedipine to twice daily\par \par Personal machine calibrated found to be accurate\par \par HTN Hx:\par -Hypertension had been well controlled until early December.2021\par -Noted that after AM meds of carvedilol and clonidine, he would see a sudden spike by noon to 180 systolic\par Then would take losartan 50 mg and BP would come down a bit but remain elevated greater than 150 through most of the rest of the day.  Evening would take the second carvedilol and clonidine.\par \par 9/3/2021 c/of burning sensation in the right lower extremity associated with some edema.  In addition, blood pressure was 170/90.\par We reduced amlodipine to 2.5 once daily.\par Remained edematous and he discontinued amlodipine entirely.\par He called back 9/7 with persistent elevation of blood pressure 182/84\par Losartan 50 mg -  added  \par \par 9/9/21   Increased clonidine from 0.1-0.2 twice daily\par              Rx hydrochlorothiazide 25 mg daily \par \par 9/23/2021 complained of heart pounding anxiety dizziness shortness of breath.  Blood pressures averaging 129/71\par Began Lexapro 10 mg p.o. daily.

## 2022-06-30 NOTE — REVIEW OF SYSTEMS
[Weight Loss (___ Lbs)] : [unfilled] ~Ulb weight loss [Dyspnea on exertion] : dyspnea during exertion [Palpitations] : palpitations [Negative] : Gastrointestinal [Weight Gain (___ Lbs)] : no recent weight gain [Chest Discomfort] : no chest discomfort [Lower Ext Edema] : no extremity edema [Orthopnea] : no orthopnea [PND] : no PND [FreeTextEntry2] : Other than as documented here and in the HPI, the thirteen point ROS is negative

## 2022-07-21 ENCOUNTER — APPOINTMENT (OUTPATIENT)
Dept: PULMONOLOGY | Facility: CLINIC | Age: 77
End: 2022-07-21

## 2022-07-21 VITALS — SYSTOLIC BLOOD PRESSURE: 98 MMHG | OXYGEN SATURATION: 98 % | DIASTOLIC BLOOD PRESSURE: 72 MMHG | HEART RATE: 52 BPM

## 2022-07-21 VITALS — BODY MASS INDEX: 29.53 KG/M2 | HEIGHT: 64 IN | WEIGHT: 173 LBS

## 2022-07-21 DIAGNOSIS — M35.3 POLYMYALGIA RHEUMATICA: ICD-10-CM

## 2022-07-21 DIAGNOSIS — Z87.09 PERSONAL HISTORY OF OTHER DISEASES OF THE RESPIRATORY SYSTEM: ICD-10-CM

## 2022-07-21 PROCEDURE — G0296 VISIT TO DETERM LDCT ELIG: CPT

## 2022-07-21 PROCEDURE — 99204 OFFICE O/P NEW MOD 45 MIN: CPT | Mod: 25

## 2022-07-21 RX ORDER — PREDNISOLONE ACETATE 10 MG/ML
1 SUSPENSION/ DROPS OPHTHALMIC
Qty: 5 | Refills: 0 | Status: DISCONTINUED | COMMUNITY
Start: 2022-04-08

## 2022-07-21 RX ORDER — TRIAMCINOLONE ACETONIDE 0.25 MG/G
0.03 OINTMENT TOPICAL
Qty: 75 | Refills: 0 | Status: DISCONTINUED | COMMUNITY
Start: 2022-02-28

## 2022-07-21 NOTE — CONSULT LETTER
[Dear  ___] : Dear  [unfilled], [Consult Letter:] : I had the pleasure of evaluating your patient, [unfilled]. [Please see my note below.] : Please see my note below. [Consult Closing:] : Thank you very much for allowing me to participate in the care of this patient.  If you have any questions, please do not hesitate to contact me. [Sincerely,] : Sincerely, [FreeTextEntry3] : Michael Brannon MD, FCCP, D. ABSM\par Pulmonary and Sleep Medicine\par [facility] [place]  no

## 2022-07-21 NOTE — DISCUSSION/SUMMARY
[FreeTextEntry1] : \par #1. Will schedule PFTs in near future to assess lung function \par #2. The patient does not appear to require chronic BD therapy at this time\par #3. Diet and exercise for weight loss\par #4. SOBOE is likely at least somewhat related to weight or deconditioning \par #5 Start autoCPAP to treat moderate KEERTHI with an AHI of 16.8; encouraged at least 70% compliance \par #6. Replace PAP equipment as needed; ordered 7/21/22\par #7. Chest CT for lung cancer screening given smoking hx. Risks and benefits regarding screening CT discussed including but not limited to the benefit of early lung cancer detection as well as other possible unknown pathology but also risk of discovering nodules or other findings which may be benign but will require periodic evaluation. \par #8. F/u in 6-8 weeks with chest CT and PFTs and again one month after starting CPAP therapy with compliance \par #9. Reviewed risks of exposure and symptoms of Covid-19 virus, including how the virus is spread and precautions to avoid nandini virus. \par \par The patient expressed understanding and agreement with the above recommendations/plan and accepts responsibility to be compliant with recommended testing, therapies, and f/u visits.\par All relevant questions and concerns were addressed.

## 2022-07-21 NOTE — REVIEW OF SYSTEMS
[SOB on Exertion] : sob on exertion [Thyroid Problem] : thyroid problem [Negative] : Psychiatric [Diabetes] : no diabetes [TextBox_91] : Knee pain

## 2022-07-21 NOTE — REASON FOR VISIT
[Initial] : an initial visit [Sleep Apnea] : sleep apnea [Obesity] : obesity [TextBox_44] : Smoking hx

## 2022-07-21 NOTE — HISTORY OF PRESENT ILLNESS
[Former] : former [Never] : never [Excessive Daytime Sleepiness] : excessive daytime sleepiness [Snoring] : snoring [Sleepy When Sedentary] : sleepy when sedentary [Initial Evaluation] : an initial evaluation of [Excess Weight] : excess weight [Currently Experiencing] : The patient is currently experiencing symptoms. [Knee Pain] : knee pain [None] : No associated symptoms are reported [Low Calorie Diet] : low calorie diet [Exercise Regimen] : exercise regimen [Fair Compliance] : fair compliance with treatment [Fair Tolerance] : fair tolerance of treatment [Fair Symptom Control] : fair symptom control [Dyslipidemia] : dyslipidemia [Sleep Apnea] : sleep apnea [Hypertension] : hypertension [Coronary Artery Disease] : coronary artery disease [High] : high [Low Calorie] : low calorie [Well Balanced Diet] : well balanced meals [___ Times/Week] : exercises [unfilled] times per week [Aerobic Conditioning] : aerobic conditioning [TextBox_4] : Patient c/o SOBOE but is otherwise without associated respiratory complaints. \par Former smoker of up to 1 ppd x 34 years, quit 2012 though continues to smoke on occasion.\par S/p gastric sleeve surgery in 2014 and lost 80 lbs since then gained back 10 lbs.\par Follows with cardiology for h/o AF on Xarelto, CAD, and difficult to control HTN.\par  [TextBox_11] : 1 [TextBox_13] : 34 [YearQuit] : 2012 [Witnessed Apnea During Sleep] : no witnessed apnea during sleep [Witnessed Gasping During Sleep] : no witnessed gasping during sleep [Unrefreshing Sleep] : no unrefreshing sleep

## 2022-08-03 ENCOUNTER — NON-APPOINTMENT (OUTPATIENT)
Age: 77
End: 2022-08-03

## 2022-08-03 VITALS — BODY MASS INDEX: 21.07 KG/M2 | HEIGHT: 76 IN | WEIGHT: 173 LBS

## 2022-08-03 NOTE — HISTORY OF PRESENT ILLNESS
[Former] : former smoker [_____ pack-years] : [unfilled] pack-years [TextBox_13] : Referred by Dr. Michael Brannon.\par \par Mr. STANFORD is a 77 year old male with a history of CAD s/p CABG, HTN, high cholesterol and prostate cancer.  Reviewed and confirmed that the patient meets screening eligibility criteria:\par \par 77 years old \par \par Smoking Status: Former smoker\par \par Number of pack(s) per day: 1\par Number of years smoked: 34\par Number of pack years smokin\par \par Number of years since quitting smoking: 10\par Quit year: \par \par No symptoms of lung cancer, including new cough, change in cough, hemoptysis, and unintentional weight loss.\par \par No personal history of lung cancer.  No lung cancer in a first degree relative.  No history of lung disease occupational exposures. [TextBox_6] : 1 [TextBox_8] : 34 [TextBox_10] : 2012

## 2022-08-04 ENCOUNTER — OUTPATIENT (OUTPATIENT)
Dept: OUTPATIENT SERVICES | Facility: HOSPITAL | Age: 77
LOS: 1 days | End: 2022-08-04

## 2022-08-04 ENCOUNTER — APPOINTMENT (OUTPATIENT)
Dept: CT IMAGING | Facility: CLINIC | Age: 77
End: 2022-08-04

## 2022-08-04 DIAGNOSIS — Z87.891 PERSONAL HISTORY OF NICOTINE DEPENDENCE: ICD-10-CM

## 2022-08-04 PROCEDURE — 71271 CT THORAX LUNG CANCER SCR C-: CPT | Mod: 26

## 2022-09-07 ENCOUNTER — RESULT CHARGE (OUTPATIENT)
Age: 77
End: 2022-09-07

## 2022-09-08 ENCOUNTER — APPOINTMENT (OUTPATIENT)
Dept: CARDIOLOGY | Facility: CLINIC | Age: 77
End: 2022-09-08

## 2022-09-08 VITALS
SYSTOLIC BLOOD PRESSURE: 118 MMHG | BODY MASS INDEX: 30.22 KG/M2 | HEIGHT: 64 IN | HEART RATE: 42 BPM | DIASTOLIC BLOOD PRESSURE: 60 MMHG | WEIGHT: 177 LBS | RESPIRATION RATE: 16 BRPM

## 2022-09-08 DIAGNOSIS — Z00.00 ENCOUNTER FOR GENERAL ADULT MEDICAL EXAMINATION W/OUT ABNORMAL FINDINGS: ICD-10-CM

## 2022-09-08 PROCEDURE — 99214 OFFICE O/P EST MOD 30 MIN: CPT | Mod: 25

## 2022-09-08 PROCEDURE — 93000 ELECTROCARDIOGRAM COMPLETE: CPT

## 2022-09-08 RX ORDER — CARVEDILOL 6.25 MG/1
6.25 TABLET, FILM COATED ORAL
Qty: 180 | Refills: 0 | Status: DISCONTINUED | COMMUNITY
Start: 2022-03-09 | End: 2022-09-08

## 2022-09-08 RX ORDER — CLONIDINE HYDROCHLORIDE 0.3 MG/1
0.3 TABLET ORAL 3 TIMES DAILY
Qty: 270 | Refills: 3 | Status: DISCONTINUED | COMMUNITY
Start: 2018-02-16 | End: 2022-09-08

## 2022-09-08 RX ORDER — AMOXICILLIN 875 MG/1
875 TABLET, FILM COATED ORAL
Qty: 20 | Refills: 0 | Status: DISCONTINUED | COMMUNITY
Start: 2022-07-19 | End: 2022-09-08

## 2022-09-09 NOTE — REVIEW OF SYSTEMS
[Weight Gain (___ Lbs)] : [unfilled] ~Ulb weight gain [Dyspnea on exertion] : dyspnea during exertion [Negative] : Cardiovascular [Weight Loss (___ Lbs)] : no recent weight loss [Chest Discomfort] : no chest discomfort [Lower Ext Edema] : no extremity edema [Palpitations] : no palpitations [Orthopnea] : no orthopnea [PND] : no PND [FreeTextEntry2] : Other than as documented here and in the HPI, the thirteen point ROS is negative [de-identified] : lightheaded

## 2022-09-09 NOTE — REASON FOR VISIT
[FreeTextEntry1] : The patient presents here for reevaluation of management of accelerated hypertension difficult to control over the last few months.\par \par Multiple adjustments made to his antihypertensives as follows:\par \par 1/13/2022, we made the following adjustments:\par 1. Losartan 50 mg was increased to 100 mg - taken at noon\par 2. Clonidine 0.2 mg  increased to TID- 2nd dose to be taken at  2pm. Dose #3 to be taken at night with Carvedilol.\par \par On 2/14/2022,\par      Increased Clonidine to 0.3 mg;  Dose #2  at 2 PM and dose #3 (with carvedilol) at 8 PM\par      Restarted HCTZ 25 mg QD\par      Started Nifedipine ER 60 mg QD\par We also arranged the timing to address when his blood pressure would surge.\par \par The a.m. dose of nifedipine would only last about 7 hours though and blood pressure seems to start increasing again by 3:30 in the afternoon.\par \par  2/28/2020 2 weeks change nifedipine to twice daily\par \par Personal machine calibrated found to be accurate\par \par HTN Hx:\par -Hypertension had been well controlled until early December.2021\par -Noted that after AM meds of carvedilol and clonidine, he would see a sudden spike by noon to 180 systolic\par Then would take losartan 50 mg and BP would come down a bit but remain elevated greater than 150 through most of the rest of the day.  Evening would take the second carvedilol and clonidine.\par \par 9/3/2021 c/of burning sensation in the right lower extremity associated with some edema.  In addition, blood pressure was 170/90.\par We reduced amlodipine to 2.5 once daily.\par Remained edematous and he discontinued amlodipine entirely.\par He called back 9/7 with persistent elevation of blood pressure 182/84\par Losartan 50 mg -  added  \par \par 9/9/21   Increased clonidine from 0.1-0.2 twice daily\par              Rx hydrochlorothiazide 25 mg daily \par \par 9/23/2021 complained of heart pounding anxiety dizziness shortness of breath.  Blood pressures averaging 129/71\par Began Lexapro 10 mg p.o. daily.

## 2022-09-09 NOTE — HISTORY OF PRESENT ILLNESS
[FreeTextEntry1] : Mr. STANFORD is a 77 year yo male with pmhx of HTN, HLD, CABG x 3 in 2014, PAF, prostate cancer, UC and bariatric surgery presents for management of uncontrolled hypertension. \par \par Pt states he is generally feeling well. No regular exercise but remains active. Denies any exertional chest pain, palpitations or SOB. \par Has chronic dyspnea on exertion which has not changed in several months. \par Denies any edema, orthopnea or PND. \par \par Current Home BP log shows significant decrease in his BP shortly  after taking Clonidine - as low as 93/65. Pt states this correlates to times that he feels "lightheaded". \par \par Awaiting CPAP machine for KEERTHI. \par \par \par \par

## 2022-09-09 NOTE — ASSESSMENT
[FreeTextEntry1] : EKG: Sinus bradycardia at 45 bpm, T wave flattening in lateral leads as seen on previous EKG. baseline artifact. \par \par LAB DATA\par \par ----12/2/20---5/15/21---1/4/22--2/24/22\par Chol--155-----155-------147------NA\par LDL---79-------86--------80-------NA\par HDL---47------46---------44-------NA\par A1C--6.1\par Creat.0.92---------------1.12-----1.29\par \par 2/24/2022:\par Aldosterone, metanephrines, normetanephrine, plasma renin all within normal limits\par \par Lab data \par 9/22/2021:\par BUN 29/creatinine 1.33 increased from 0.94 since adding losartan 50 and HCTZ 25 mg\par \par Portable Monitor Sleep Study: (3/31/22)\par MURALI (surrogate for AHI): 16.8\par OAI 2.5\par ANGELINA 0.8\par Lowest Desat 73\par \par Renal artery duplex 2/24/2022:\par There is no evidence of renal artery stenosis.\par \par Echocardiogram 9/16/2021:\par Normal LV size and function ejection fraction 60 to 65%\par Mild left atrial dilatation\par No significant valvular disease.\par \par Venous duplex lower extremity 9/23/2021:\par No evidence of DVT bilaterally\par \par 2/24/2022: Renal artery Doppler\par No evidence of renal artery stenosis \par \par extremity arterial duplex 9/16/2021:\par No evidence of any significant obstructive PAD.\par \par Carotid study 10/30/20\par Mild plaque in the ICA and prox. ICA\par Moderate plaque in the left ECA\par Mild plaque in the distal right CCA\par Mild plaque in the right bulb\par Mild plaque in the right prox ICA\par \par Carotid study 9/20/18:\par Mild bilateral atherosclerotic plaquing. No hemodynamically significant stenosis. Left slightly worse than right.\par \par Pharm. stress test 10/26/20\par Peal HR 68 bpm\par Peak /70\par Pcc. PACs, ECG negative for ischemia.\par Normal SPECT.\par EF 62%\par \par Echocardiogram 9/24/18:\par LVH with hyperdynamic left ventricular function. Ejection fraction greater than 70%.\par Mild subaortic outflow tract obstruction.\par Mild mitral and tricuspid insufficiency which appears less than on the prior echocardiogram.\par \par IMPRESSION:\par 1. Blood pressure has been difficult to control in the past. Home BP shows readings around 140-150/90 when Clonidine seems to be wearing off and then decreases to 90/60's after Clonidine is taken. Drastic drop in blood pressure likely resulting in lightheadedness. \par \par 2. Renal artery stenosis seems to be safely excluded. Other causes of secondary hypertension seems to be excluded based on the above blood work. \par \par 3.No ischemic symptoms nor any evidence of ischemia seen on a pharmacologic stress test ( pharm. due                   to bad knees) 8  years  status post CABG. .\par \par 4.Mild bilateral atherosclerotic carotid disease unchanged from prior, no obstruction. \par    No evidence of any significant lower extremity PAD and the burning in his calf muscles has resolved.\par \par 5. Rare clinical recurrence of palps c/w his hx of paroxysmal atrial fibrillation on low dose sotalol. \par     Tolerating Xarelto and compliant with this. \par \par 6. Lipid control is adequate, overall goal for LDL  is < 70. He continues to follow a strict diet. No recent lipid panel for review. \par \par 7.  EKG today shows sinus bradycardia likely related to Carvedilol and Clonidine, unchanged from prior. Carvedilol was decreased to 3.125 BID on his last office visit due to bradycardia.  \par \par           \par Plan:\par 1.  Decrease Clonidine to 0.2 mg TID given symptomatic hypotension after taking Clonidine 0.3 mg. Pt advised to check his blood pressure daily for 2 weeks and call us back with average. Adjustments to medications will be made at that time if appropriate.\par \par 2.  Follow-up lipid panel. \par \par 3.  CPAP for KEERTHI when available to him. \par \par 4.  Continue statin therapy. \par \par 5. Continue Xarelto for thromboembolic ppx given PAF. \par \par \par Pt knows to call if any new or worsening symptoms. In the absence of any cardiac associated symptoms clinical follow up can be made in 4 months. \par

## 2022-11-10 ENCOUNTER — APPOINTMENT (OUTPATIENT)
Dept: PULMONOLOGY | Facility: CLINIC | Age: 77
End: 2022-11-10

## 2022-11-10 VITALS
SYSTOLIC BLOOD PRESSURE: 110 MMHG | DIASTOLIC BLOOD PRESSURE: 68 MMHG | RESPIRATION RATE: 14 BRPM | HEART RATE: 62 BPM | OXYGEN SATURATION: 98 %

## 2022-11-10 VITALS — WEIGHT: 176 LBS | HEIGHT: 62 IN | BODY MASS INDEX: 32.39 KG/M2

## 2022-11-10 PROCEDURE — 94729 DIFFUSING CAPACITY: CPT

## 2022-11-10 PROCEDURE — 94727 GAS DIL/WSHOT DETER LNG VOL: CPT

## 2022-11-10 PROCEDURE — 94010 BREATHING CAPACITY TEST: CPT

## 2022-11-10 PROCEDURE — 99214 OFFICE O/P EST MOD 30 MIN: CPT | Mod: 25

## 2022-11-10 PROCEDURE — 85018 HEMOGLOBIN: CPT | Mod: QW

## 2022-11-10 NOTE — CONSULT LETTER
[Dear  ___] : Dear  [unfilled], [Consult Letter:] : I had the pleasure of evaluating your patient, [unfilled]. [Please see my note below.] : Please see my note below. [Consult Closing:] : Thank you very much for allowing me to participate in the care of this patient.  If you have any questions, please do not hesitate to contact me. [Sincerely,] : Sincerely, [FreeTextEntry3] : Michael Brannon MD, FCCP, D. ABSM\par Pulmonary and Sleep Medicine\par [facility] at [place]

## 2022-11-10 NOTE — DISCUSSION/SUMMARY
[FreeTextEntry1] : \par #1. PFTs performed today are essentially normal.\par #2. The patient does not appear to require chronic BD therapy at this time\par #3. SOBOE is likely at least somewhat related to weight or deconditioning given PFT findings\par #4. Diet and exercise for weight loss \par #5 Pt tried autoCPAP to treat moderate KEERTHI with an AHI of 16.8 but could not tolerate and wants to return the machine AMA; offered other treatment options such as different mask, MAD, or Inspire therapy but he does not want to proceed with further therapy\par #6.  The patient understands the risks of untreated KEERTHI including heart disease, strokes, hypertension, pulmonary hypertension, and motor vehicle accidents as well as the need for treatment and weight loss. \par #7. Chest CT for lung cancer screening given smoking hx. Risks and benefits regarding screening CT discussed including but not limited to the benefit of early lung cancer detection as well as other possible unknown pathology but also risk of discovering nodules or other findings which may be benign but will require periodic evaluation. \par #8. F/u in 9 months with chest CT\par #9. Reviewed risks of exposure and symptoms of Covid-19 virus, including how the virus is spread and precautions to avoid nandini virus. \par \par The patient expressed understanding and agreement with the above recommendations/plan and accepts responsibility to be compliant with recommended testing, therapies, and f/u visits.\par All relevant questions and concerns were addressed.

## 2022-11-10 NOTE — REASON FOR VISIT
[Initial] : an initial visit [Sleep Apnea] : sleep apnea [Obesity] : obesity [TextBox_44] : Smoking hx Picato Counseling:  I discussed with the patient the risks of Picato including but not limited to erythema, scaling, itching, weeping, crusting, and pain.

## 2022-11-24 ENCOUNTER — NON-APPOINTMENT (OUTPATIENT)
Age: 77
End: 2022-11-24

## 2022-12-15 ENCOUNTER — APPOINTMENT (OUTPATIENT)
Dept: CARDIOLOGY | Facility: CLINIC | Age: 77
End: 2022-12-15

## 2022-12-22 ENCOUNTER — OFFICE (OUTPATIENT)
Dept: URBAN - METROPOLITAN AREA CLINIC 94 | Facility: CLINIC | Age: 77
Setting detail: OPHTHALMOLOGY
End: 2022-12-22
Payer: MEDICARE

## 2022-12-22 ENCOUNTER — APPOINTMENT (OUTPATIENT)
Dept: CARDIOLOGY | Facility: CLINIC | Age: 77
End: 2022-12-22

## 2022-12-22 DIAGNOSIS — H35.3124: ICD-10-CM

## 2022-12-22 DIAGNOSIS — H35.3211: ICD-10-CM

## 2022-12-22 PROCEDURE — 92134 CPTRZ OPH DX IMG PST SGM RTA: CPT | Performed by: SPECIALIST

## 2022-12-22 PROCEDURE — 92012 INTRM OPH EXAM EST PATIENT: CPT | Performed by: SPECIALIST

## 2022-12-22 PROCEDURE — 93880 EXTRACRANIAL BILAT STUDY: CPT

## 2022-12-22 ASSESSMENT — AXIALLENGTH_DERIVED: OD_AL: 25.45

## 2022-12-22 ASSESSMENT — SPHEQUIV_DERIVED: OD_SPHEQUIV: -1.375

## 2022-12-22 ASSESSMENT — REFRACTION_AUTOREFRACTION
OD_SPHERE: -0.50
OD_CYLINDER: -1.75
OD_AXIS: 180
OS_SPHERE: UNABLE

## 2022-12-22 ASSESSMENT — VISUAL ACUITY
OS_BCVA: 20/50-1
OD_BCVA: 20/30

## 2022-12-22 ASSESSMENT — CONFRONTATIONAL VISUAL FIELD TEST (CVF)
OS_FINDINGS: FULL
OD_FINDINGS: FULL

## 2022-12-22 ASSESSMENT — KERATOMETRY
OS_AXISANGLE_DEGREES: 003
OD_K1POWER_DIOPTERS: 37.84
OD_AXISANGLE_DEGREES: 072
OS_K2POWER_DIOPTERS: 40.52
OD_K2POWER_DIOPTERS: 42.72
OS_K1POWER_DIOPTERS: 38.62

## 2022-12-22 ASSESSMENT — TONOMETRY
OS_IOP_MMHG: 18
OD_IOP_MMHG: 13

## 2022-12-22 ASSESSMENT — CORNEAL EDEMA CLINICAL DESCRIPTION: OD_CORNEALEDEMA: T OVER THE WOUND

## 2023-01-05 ENCOUNTER — NON-APPOINTMENT (OUTPATIENT)
Age: 78
End: 2023-01-05

## 2023-01-19 ENCOUNTER — MED ADMIN CHARGE (OUTPATIENT)
Age: 78
End: 2023-01-19

## 2023-01-19 ENCOUNTER — APPOINTMENT (OUTPATIENT)
Dept: CARDIOLOGY | Facility: CLINIC | Age: 78
End: 2023-01-19
Payer: MEDICARE

## 2023-01-19 PROCEDURE — 93015 CV STRESS TEST SUPVJ I&R: CPT

## 2023-01-19 PROCEDURE — 78452 HT MUSCLE IMAGE SPECT MULT: CPT

## 2023-01-19 PROCEDURE — A9500: CPT

## 2023-01-19 RX ORDER — REGADENOSON 0.08 MG/ML
0.4 INJECTION, SOLUTION INTRAVENOUS
Qty: 4 | Refills: 0 | Status: COMPLETED | OUTPATIENT
Start: 2023-01-19

## 2023-01-19 RX ADMIN — REGADENOSON 0 MG/5ML: 0.08 INJECTION, SOLUTION INTRAVENOUS at 00:00

## 2023-01-26 ENCOUNTER — APPOINTMENT (OUTPATIENT)
Dept: CARDIOLOGY | Facility: CLINIC | Age: 78
End: 2023-01-26
Payer: MEDICARE

## 2023-01-26 VITALS
SYSTOLIC BLOOD PRESSURE: 140 MMHG | WEIGHT: 177 LBS | RESPIRATION RATE: 16 BRPM | HEART RATE: 47 BPM | OXYGEN SATURATION: 97 % | BODY MASS INDEX: 32.57 KG/M2 | HEIGHT: 62 IN | DIASTOLIC BLOOD PRESSURE: 90 MMHG

## 2023-01-26 PROCEDURE — 99214 OFFICE O/P EST MOD 30 MIN: CPT

## 2023-01-26 RX ORDER — ATORVASTATIN CALCIUM 20 MG/1
20 TABLET, FILM COATED ORAL
Qty: 90 | Refills: 3 | Status: DISCONTINUED | COMMUNITY
Start: 2021-09-26 | End: 2023-01-26

## 2023-01-26 NOTE — REVIEW OF SYSTEMS
[Weight Gain (___ Lbs)] : [unfilled] ~Ulb weight gain [Dyspnea on exertion] : dyspnea during exertion [Negative] : Gastrointestinal [Weight Loss (___ Lbs)] : no recent weight loss [Chest Discomfort] : no chest discomfort [Lower Ext Edema] : no extremity edema [Palpitations] : no palpitations [Orthopnea] : no orthopnea [PND] : no PND [FreeTextEntry2] : Other than as documented here and in the HPI, the thirteen point ROS is negative [de-identified] : lightheaded

## 2023-01-26 NOTE — REASON FOR VISIT
[FreeTextEntry1] : The patient presents here for reevaluation of management of accelerated hypertension difficult to control over the last few months.\par \par Multiple adjustments made to his antihypertensives as follows:\par \par 1/13/2022, we made the following adjustments:\par 1. Losartan 50 mg was increased to 100 mg - taken at noon\par 2. Clonidine 0.2 mg  increased to TID- 2nd dose to be taken at  2pm. Dose #3 to be taken at night with Carvedilol.\par \par On 2/14/2022,\par      Increased Clonidine to 0.3 mg;  Dose #2  at 2 PM and dose #3 (with carvedilol) at 8 PM\par      Restarted HCTZ 25 mg QD\par      Started Nifedipine ER 60 mg QD\par We also arranged the timing to address when his blood pressure would surge.\par \par The a.m. dose of nifedipine would only last about 7 hours though and blood pressure seems to start increasing again by 3:30 in the afternoon.\par \par 2/28/2022  changed nifedipine to twice daily\par \par 9/8/2022-due to periodic dizziness and low blood pressure changed clonidine back to 0.2 3 times daily.\par \par Current blood pressure log shows that he continues to surge about every 8 hours.\par In between, blood pressure is well controlled.\par Early a.m. blood pressure 160s over 90s but responds promptly to the clonidine nifedipine and losartan.  2 PM blood pressure are up to 155/85 responding promptly to the second nifedipine and clonidine\par 8:30 PM blood pressure up to 155/90 again responding to the p.m. dosing of clonidine\par \par Personal machine calibrated found to be accurate\par \par HTN Hx:\par -Hypertension had been well controlled until early December.2021\par -Noted that after AM meds of carvedilol and clonidine, he would see a sudden spike by noon to 180 systolic\par Then would take losartan 50 mg and BP would come down a bit but remain elevated greater than 150 through most of the rest of the day.  Evening would take the second carvedilol and clonidine.\par \par 9/3/2021 c/of burning sensation in the right lower extremity associated with some edema.  In addition, blood pressure was 170/90.\par We reduced amlodipine to 2.5 once daily.\par Remained edematous and he discontinued amlodipine entirely.\par He called back 9/7 with persistent elevation of blood pressure 182/84\par Losartan 50 mg -  added  \par \par 9/9/21   Increased clonidine from 0.1-0.2 twice daily\par              Rx hydrochlorothiazide 25 mg daily \par \par 9/23/2021 complained of heart pounding anxiety dizziness shortness of breath.  Blood pressures averaging 129/71\par Began Lexapro 10 mg p.o. daily.

## 2023-01-26 NOTE — ASSESSMENT
[FreeTextEntry1] : LAB DATA\par \par ----12/2/20---5/15/21---1/4/22--2/24/22--12/5/22\par Chol--155-----155-------147------NA--------165\par LDL---79-------86--------80-------NA---------94\par HDL---47------46---------44-------NA---------41\par A1C--6.1----------------------------------------\par Creat.0.92---------------1.12-----1.29\par \par 2/24/2022:\par Aldosterone, metanephrines, normetanephrine, plasma renin all within normal limits\par \par Lab data \par 9/22/2021:\par BUN 29/creatinine 1.33 increased from 0.94 since adding losartan 50 and HCTZ 25 mg\par \par Portable Monitor Sleep Study: (3/31/22)\par MURALI (surrogate for AHI): 16.8\par OAI 2.5\par ANGELINA 0.8\par Lowest Desat 73\par \par Renal artery duplex 2/24/2022:\par There is no evidence of renal artery stenosis.\par \par Echocardiogram 9/16/2021:\par Normal LV size and function ejection fraction 60 to 65%\par Mild left atrial dilatation\par No significant valvular disease.\par \par Venous duplex lower extremity 9/23/2021:\par No evidence of DVT bilaterally\par \par 2/24/2022: Renal artery Doppler\par No evidence of renal artery stenosis \par \par extremity arterial duplex 9/16/2021:\par No evidence of any significant obstructive PAD.\par \par Carotid duplex 12/22/2022\par Left: Mild bulb: Moderate ICA: Peak 101 cm/s: 1 to 49%\par Right: Mild CCA bulb, ICA: Peak 81 cm/s: 1 to 41%\par \par Carotid study 10/30/20\par Mild plaque in the ICA and prox. ICA\par Moderate plaque in the left ECA\par Mild plaque in the distal right CCA\par Mild plaque in the right bulb\par Mild plaque in the right prox ICA\par \par Carotid study 9/20/18:\par Mild bilateral atherosclerotic plaquing. No hemodynamically significant stenosis. Left slightly worse than right.\par \par Pharmacologic stress 1/19/2023:\par Normal SPECT imaging.\par No evidence of ischemia.  Unchanged from prior study 2018\par \par Pharm. stress test 10/26/20\par Peal HR 68 bpm\par Peak /70\par Pcc. PACs, ECG negative for ischemia.\par Normal SPECT.\par EF 62%\par \par Echocardiogram 9/24/18:\par LVH with hyperdynamic left ventricular function. Ejection fraction greater than 70%.\par Mild subaortic outflow tract obstruction.\par Mild mitral and tricuspid insufficiency which appears less than on the prior echocardiogram.\par \par IMPRESSION:\par 1. Blood pressure has been difficult to control in the past. Home BP shows readings around 150/90 when Clonidine seems to be wearing off and then decreases to 120/75  after Clonidine is taken.  Since decreasing clonidine from 0.3-0.2 no longer lightheaded.\par \par 2. Renal artery stenosis and other causes of secondary hypertension seems to be excluded \par \par 3.No ischemic symptoms nor any evidence of ischemia seen on 2020 pharmacologic stress test ( pharm. due                   to bad knees) 8  years  status post CABG. .\par \par 4.stable mild bilateral atherosclerotic carotid disease unchanged from prior, no obstruction. \par    No evidence of any significant lower extremity PAD and the burning in his calf muscles has resolved.\par \par 5. Rare clinical recurrence of palps c/w his hx of paroxysmal atrial fibrillation on low dose sotalol. \par     Tolerating Xarelto and compliant with this. \par \par 6.  Hyperlipidemia with LDL 94 not at target.\par \par 7.  EKG today shows sinus bradycardia likely related to Carvedilol and Clonidine, unchanged from prior. Carvedilol was decreased to 3.125 BID on his last office visit due to bradycardia.  \par \par 8  obstructive sleep apnea-patient cannot tolerate the mask and is electing to live without it understanding the consequent\par           \par Plan:\par 1.  No changes in current antihypertensive regimen.\par     Patient comfortable with current regimen and understanding that we are striving for best control possible without making him feel ill, fatigued or lightheaded\par \par 2.  Follow-up lipid panel. \par \par 3.  Choosing not to use CPAP for his obstructive sleep apnea\par \par 4.  Discontinue atorvastatin and begin rosuvastatin 20 mg daily repeat blood work in 3 months\par \par 5. Continue Xarelto for thromboembolic ppx given PAF. \par \par \par Pt knows to call if any new or worsening symptoms. In the absence of any cardiac associated symptoms clinical follow up can be made in 4 months. \par

## 2023-03-18 ENCOUNTER — OFFICE (OUTPATIENT)
Dept: URBAN - METROPOLITAN AREA CLINIC 94 | Facility: CLINIC | Age: 78
Setting detail: OPHTHALMOLOGY
End: 2023-03-18
Payer: MEDICARE

## 2023-03-18 DIAGNOSIS — H35.3124: ICD-10-CM

## 2023-03-18 DIAGNOSIS — H35.3211: ICD-10-CM

## 2023-03-18 PROCEDURE — 92134 CPTRZ OPH DX IMG PST SGM RTA: CPT | Performed by: SPECIALIST

## 2023-03-18 PROCEDURE — 92012 INTRM OPH EXAM EST PATIENT: CPT | Performed by: SPECIALIST

## 2023-03-18 ASSESSMENT — REFRACTION_AUTOREFRACTION
OD_CYLINDER: -1.75
OD_AXIS: 180
OD_SPHERE: -0.50
OS_SPHERE: UNABLE

## 2023-03-18 ASSESSMENT — KERATOMETRY
OS_K1POWER_DIOPTERS: 38.62
OS_K2POWER_DIOPTERS: 40.52
OD_AXISANGLE_DEGREES: 072
OD_K1POWER_DIOPTERS: 37.84
OS_AXISANGLE_DEGREES: 003
OD_K2POWER_DIOPTERS: 42.72

## 2023-03-18 ASSESSMENT — VISUAL ACUITY
OS_BCVA: 20/60+1
OD_BCVA: 20/25

## 2023-03-18 ASSESSMENT — TONOMETRY
OS_IOP_MMHG: 16
OD_IOP_MMHG: 11

## 2023-03-18 ASSESSMENT — CONFRONTATIONAL VISUAL FIELD TEST (CVF)
OD_FINDINGS: FULL
OS_FINDINGS: FULL

## 2023-03-18 ASSESSMENT — CORNEAL EDEMA CLINICAL DESCRIPTION: OD_CORNEALEDEMA: T OVER THE WOUND

## 2023-03-18 ASSESSMENT — SPHEQUIV_DERIVED: OD_SPHEQUIV: -1.375

## 2023-03-18 ASSESSMENT — AXIALLENGTH_DERIVED: OD_AL: 25.45

## 2023-03-22 RX ORDER — KIT FOR THE PREPARATION OF TECHNETIUM TC99M SESTAMIBI 1 MG/5ML
INJECTION, POWDER, LYOPHILIZED, FOR SOLUTION PARENTERAL
Refills: 0 | Status: COMPLETED | OUTPATIENT
Start: 2023-03-22

## 2023-03-22 RX ADMIN — KIT FOR THE PREPARATION OF TECHNETIUM TC99M SESTAMIBI 0: 1 INJECTION, POWDER, LYOPHILIZED, FOR SOLUTION PARENTERAL at 00:00

## 2023-04-28 NOTE — HISTORY OF PRESENT ILLNESS
Scheduled date of EGD/colonoscopy (as of today):7/14/23  Physician performing EGD/colonoscopy: Anastacio  Location of EGD/colonoscopy:[facility]  Desired bowel prep reviewed with patient:Dulco/Miralax  Instructions reviewed with patient by:Matthias nicole  Clearances:   none
[FreeTextEntry1] : His cardiac history includes:\par 1.	CABG x3 in 2014.\par 2.	Hypertension.\par 3.	Hyperlipidemia.\par \par Further medical history includes:\par 1.	GERD.\par 2.	Prostate cancer.\par 3.	Ulcerative colitis.\par 4.	Bariatric surgery.\par 5.	The PMR.\par

## 2023-05-04 ENCOUNTER — APPOINTMENT (OUTPATIENT)
Dept: CARDIOLOGY | Facility: CLINIC | Age: 78
End: 2023-05-04
Payer: MEDICARE

## 2023-05-04 VITALS
SYSTOLIC BLOOD PRESSURE: 112 MMHG | HEART RATE: 51 BPM | WEIGHT: 177 LBS | OXYGEN SATURATION: 95 % | HEIGHT: 62 IN | BODY MASS INDEX: 32.57 KG/M2 | RESPIRATION RATE: 16 BRPM | DIASTOLIC BLOOD PRESSURE: 70 MMHG

## 2023-05-04 DIAGNOSIS — E66.9 OBESITY, UNSPECIFIED: ICD-10-CM

## 2023-05-04 PROCEDURE — 99214 OFFICE O/P EST MOD 30 MIN: CPT | Mod: 25

## 2023-05-04 PROCEDURE — 93000 ELECTROCARDIOGRAM COMPLETE: CPT

## 2023-05-04 RX ORDER — OMEPRAZOLE 40 MG/1
40 CAPSULE, DELAYED RELEASE ORAL
Qty: 90 | Refills: 3 | Status: DISCONTINUED | COMMUNITY
End: 2023-05-04

## 2023-05-04 NOTE — HISTORY OF PRESENT ILLNESS
[FreeTextEntry1] : Mr. STANFORD is a 77 year yo male with pmhx of HTN, HLD, CABG x 3 in 2014, PAF, prostate cancer, UC and bariatric surgery presents for management of uncontrolled hypertension. \par \par Pt states he is generally feeling well. No regular exercise but remains active. Denies any exertional chest pain, palpitations or SOB. \par Has chronic dyspnea on exertion which has not changed in several months. \par Denies any edema, orthopnea or PND. \par \par Managing to take clonidine now without feeling lightheaded.\par \par \par \par \par

## 2023-05-04 NOTE — REVIEW OF SYSTEMS
[Weight Gain (___ Lbs)] : [unfilled] ~Ulb weight gain [Dyspnea on exertion] : dyspnea during exertion [Negative] : Gastrointestinal [Weight Loss (___ Lbs)] : no recent weight loss [Chest Discomfort] : no chest discomfort [Lower Ext Edema] : no extremity edema [Palpitations] : no palpitations [Orthopnea] : no orthopnea [PND] : no PND [FreeTextEntry2] : Other than as documented here and in the HPI, the thirteen point ROS is negative [de-identified] : lightheaded

## 2023-05-04 NOTE — REASON FOR VISIT
[FreeTextEntry1] : The patient presents here for reevaluation \par \par We were having difficulty with control of his hypertension and after multiple adjustments made to his antihypertensives as follows:\par \par 1/13/2022, we made the following adjustments:\par 1. Losartan 50 mg was increased to 100 mg - taken at noon\par 2. Clonidine 0.2 mg  increased to TID- 2nd dose to be taken at  2pm. Dose #3 to be taken at night with Carvedilol.\par \par On 2/14/2022,\par      Increased Clonidine to 0.3 mg;  Dose #2  at 2 PM and dose #3 (with carvedilol) at 8 PM\par      Restarted HCTZ 25 mg QD\par      Started Nifedipine ER 60 mg QD\par We also arranged the timing to address when his blood pressure would surge.\par \par The a.m. dose of nifedipine would only last about 7 hours though and blood pressure seems to start increasing again by 3:30 in the afternoon.\par \par 2/28/2022  changed nifedipine to twice daily\par \par 9/8/2022-due to periodic dizziness and low blood pressure changed clonidine back to 0.2 3 times daily.\par \par States his blood pressure currently remains well controlled on this regimen.\par \par Personal machine calibrated found to be accurate\par \par HTN Hx:\par -Hypertension had been well controlled until early December.2021\par -Noted that after AM meds of carvedilol and clonidine, he would see a sudden spike by noon to 180 systolic\par Then would take losartan 50 mg and BP would come down a bit but remain elevated greater than 150 through most of the rest of the day.  Evening would take the second carvedilol and clonidine.\par \par 9/3/2021 c/of burning sensation in the right lower extremity associated with some edema.  In addition, blood pressure was 170/90.\par We reduced amlodipine to 2.5 once daily.\par Remained edematous and he discontinued amlodipine entirely.\par He called back 9/7 with persistent elevation of blood pressure 182/84\par Losartan 50 mg -  added  \par \par 9/9/21   Increased clonidine from 0.1-0.2 twice daily\par              Rx hydrochlorothiazide 25 mg daily \par \par 9/23/2021 complained of heart pounding anxiety dizziness shortness of breath.  Blood pressures averaging 129/71\par Began Lexapro 10 mg p.o. daily.

## 2023-05-04 NOTE — ASSESSMENT
[FreeTextEntry1] : ECG: Sinus bradycardia 51 left anterior fascicular block diffuse nonsevere to abnormalities.\par \par \par LAB DATA\par \par ----12/2/20---5/15/21---1/4/22--2/24/22--12/5/22--4/20/23\par Chol--155-----155-------147------NA--------165-----136\par LDL---79-------86--------80-------NA---------94------42\par HDL---47------46---------44-------NA---------41------68\par A1C--6.1----------------------------------------\par Creat.0.92---------------1.12-----1.29-----------------1.27\par \par 2/24/2022:\par Aldosterone, metanephrines, normetanephrine, plasma renin all within normal limits\par \par Lab data \par 9/22/2021:\par BUN 29/creatinine 1.33 increased from 0.94 since adding losartan 50 and HCTZ 25 mg\par \par Portable Monitor Sleep Study: (3/31/22)\par MURALI (surrogate for AHI): 16.8\par OAI 2.5\par ANGELINA 0.8\par Lowest Desat 73\par \par Renal artery duplex 2/24/2022:\par There is no evidence of renal artery stenosis.\par \par Echocardiogram 9/16/2021:\par Normal LV size and function ejection fraction 60 to 65%\par Mild left atrial dilatation\par No significant valvular disease.\par \par Venous duplex lower extremity 9/23/2021:\par No evidence of DVT bilaterally\par \par 2/24/2022: Renal artery Doppler\par No evidence of renal artery stenosis \par \par extremity arterial duplex 9/16/2021:\par No evidence of any significant obstructive PAD.\par \par Carotid duplex 12/22/2022\par Left: Mild bulb: Moderate ICA: Peak 101 cm/s: 1 to 49%\par Right: Mild CCA bulb, ICA: Peak 81 cm/s: 1 to 41%\par \par Carotid study 10/30/20\par Mild plaque in the ICA and prox. ICA\par Moderate plaque in the left ECA\par Mild plaque in the distal right CCA\par Mild plaque in the right bulb\par Mild plaque in the right prox ICA\par \par Carotid study 9/20/18:\par Mild bilateral atherosclerotic plaquing. No hemodynamically significant stenosis. Left slightly worse than right.\par \par Pharmacologic stress 1/19/2023:\par Normal SPECT imaging.\par No evidence of ischemia.  Unchanged from prior study 2018\par \par Pharm. stress test 10/26/20\par Peal HR 68 bpm\par Peak /70\par Pcc. PACs, ECG negative for ischemia.\par Normal SPECT.\par EF 62%\par \par Echocardiogram 9/24/18:\par LVH with hyperdynamic left ventricular function. Ejection fraction greater than 70%.\par Mild subaortic outflow tract obstruction.\par Mild mitral and tricuspid insufficiency which appears less than on the prior echocardiogram.\par \par IMPRESSION:\par 1. Blood pressure has been difficult to control in the past. Home BP is now well controlled.  Since decreasing clonidine from 0.3-0.2 no longer lightheaded.\par \par 2. Renal artery stenosis and other causes of secondary hypertension seems to be excluded \par \par 3.No ischemic symptoms nor any evidence of ischemia seen on January 2023 pharmacologic stress test ( pharm. due to bad knees) 8  years  status post CABG. .\par \par 4.stable mild bilateral atherosclerotic carotid disease unchanged from prior, no obstruction. \par    No evidence of any significant lower extremity PAD and the burning in his calf muscles has resolved.\par \par 5. Rare clinical recurrence of palps c/w his hx of paroxysmal atrial fibrillation on low dose sotalol. \par     Tolerating Xarelto and compliant with this. \par \par 6.  Hyperlipidemia with marked improvement in LDL from 94-68 with change to rosuvastatin 20.\par \par 7.  EKG today shows sinus bradycardia likely related to Carvedilol and Clonidine, unchanged from prior. Carvedilol was decreased to 3.125 BID on his last office visit due to bradycardia.  \par \par 8  obstructive sleep apnea-patient cannot tolerate the mask and is electing to live without it understanding the consequent\par           \par Plan:\par 1.  No changes in current antihypertensive regimen.\par     Patient comfortable with current regimen and understanding that we are striving for best control possible     without making him feel ill, fatigued or lightheaded\par \par 2.  Follow-up lipid panel \par \par 3.  Choosing not to use CPAP for his obstructive sleep apnea\par \par 4.  Continue rosuvastatin 20 mg daily repeat blood work in 3 months\par \par 5. Continue Xarelto for thromboembolic ppx given PAF. \par \par \par Pt knows to call if any new or worsening symptoms. In the absence of any cardiac associated symptoms clinical follow up can be made in 4 months. \par

## 2023-05-20 ENCOUNTER — OFFICE (OUTPATIENT)
Dept: URBAN - METROPOLITAN AREA CLINIC 94 | Facility: CLINIC | Age: 78
Setting detail: OPHTHALMOLOGY
End: 2023-05-20
Payer: MEDICARE

## 2023-05-20 DIAGNOSIS — H35.3124: ICD-10-CM

## 2023-05-20 DIAGNOSIS — H35.3211: ICD-10-CM

## 2023-05-20 PROCEDURE — 92134 CPTRZ OPH DX IMG PST SGM RTA: CPT | Performed by: SPECIALIST

## 2023-05-20 PROCEDURE — 92014 COMPRE OPH EXAM EST PT 1/>: CPT | Performed by: SPECIALIST

## 2023-05-20 ASSESSMENT — SPHEQUIV_DERIVED: OD_SPHEQUIV: -1.375

## 2023-05-20 ASSESSMENT — TONOMETRY: OS_IOP_MMHG: 13

## 2023-05-20 ASSESSMENT — REFRACTION_AUTOREFRACTION
OD_AXIS: 180
OD_CYLINDER: -1.75
OD_SPHERE: -0.50
OS_SPHERE: UNABLE

## 2023-05-20 ASSESSMENT — CONFRONTATIONAL VISUAL FIELD TEST (CVF)
OS_FINDINGS: FULL
OD_FINDINGS: FULL

## 2023-05-20 ASSESSMENT — KERATOMETRY
OD_K1POWER_DIOPTERS: 37.84
OS_K1POWER_DIOPTERS: 38.62
OD_AXISANGLE_DEGREES: 072
OS_K2POWER_DIOPTERS: 40.52
OD_K2POWER_DIOPTERS: 42.72
OS_AXISANGLE_DEGREES: 003

## 2023-05-20 ASSESSMENT — VISUAL ACUITY
OD_BCVA: 20/50
OS_BCVA: 20/60

## 2023-05-20 ASSESSMENT — AXIALLENGTH_DERIVED: OD_AL: 25.45

## 2023-05-20 ASSESSMENT — CORNEAL EDEMA CLINICAL DESCRIPTION: OD_CORNEALEDEMA: T OVER THE WOUND

## 2023-05-22 ENCOUNTER — NON-APPOINTMENT (OUTPATIENT)
Age: 78
End: 2023-05-22

## 2023-06-05 ENCOUNTER — APPOINTMENT (OUTPATIENT)
Dept: CARDIOLOGY | Facility: CLINIC | Age: 78
End: 2023-06-05
Payer: MEDICARE

## 2023-06-05 VITALS
WEIGHT: 173 LBS | HEIGHT: 62 IN | HEART RATE: 55 BPM | BODY MASS INDEX: 31.83 KG/M2 | OXYGEN SATURATION: 98 % | DIASTOLIC BLOOD PRESSURE: 72 MMHG | SYSTOLIC BLOOD PRESSURE: 100 MMHG | RESPIRATION RATE: 16 BRPM

## 2023-06-05 PROCEDURE — 93000 ELECTROCARDIOGRAM COMPLETE: CPT

## 2023-06-05 PROCEDURE — 99215 OFFICE O/P EST HI 40 MIN: CPT

## 2023-06-05 RX ORDER — ASPIRIN ENTERIC COATED TABLETS 81 MG 81 MG/1
81 TABLET, DELAYED RELEASE ORAL DAILY
Qty: 90 | Refills: 3 | Status: DISCONTINUED | COMMUNITY
End: 2023-06-05

## 2023-06-05 RX ORDER — ASPIRIN ENTERIC COATED TABLETS 81 MG 81 MG/1
81 TABLET, DELAYED RELEASE ORAL DAILY
Qty: 90 | Refills: 3 | Status: ACTIVE | COMMUNITY

## 2023-06-05 RX ORDER — CLONIDINE HYDROCHLORIDE 0.2 MG/1
0.2 TABLET ORAL 3 TIMES DAILY
Qty: 270 | Refills: 2 | Status: DISCONTINUED | COMMUNITY
Start: 2022-09-08 | End: 2023-06-05

## 2023-06-05 NOTE — REASON FOR VISIT
[FreeTextEntry1] : The patient presents here for reevaluation after hospitalization in [place] 5/30-6/2/2023.\par \par Patient presented with acute onset of nausea vertigo and vomiting.\par Initially hypertensive and hypokalemic (potassium-2.2) and creatinine increased to 2.4\par QTc 700 and sotalol was discontinued\par Troponin 1944, 1256, 170,\par \par Cardiac catheterization 6/1/2023:\par LMCA: Normal\par LAD: Proximal-80% with competitive flow\par Circumflex: No disease\par OM1: 70% RCA: Sequential 50% lesions\par Posterolateral branch 50%\par SVG OM1 patent\par LIMA to LAD patent\par (History of CABG x2 5/28/14)\par \par Medication changes at discharge:\par At discharge, sotalol, hydrochlorothiazide discontinued\par Nifedipine changed from twice daily to once daily\par Carvedilol increased to 6.25 twice daily\par Clonidine had been previously changed from 0.2-0.1 3 times daily.\par \par Yesterday the patient began taking all of his meds as usual with the exception of the hydrochlorothiazide.\par \par Previously this year because of difficulty controlling his hypertension, multiple adjustments made to his antihypertensives as follows:\par \par 1/13/2022, we made the following adjustments:\par 1. Losartan 50 mg was increased to 100 mg - taken at noon\par 2. Clonidine 0.2 mg  increased to TID- 2nd dose to be taken at  2pm. Dose #3 to be taken at night with Carvedilol.\par \par On 2/14/2022,\par      Increased Clonidine to 0.3 mg;  Dose #2  at 2 PM and dose #3 (with carvedilol) at 8 PM\par      Restarted HCTZ 25 mg QD\par      Started Nifedipine ER 60 mg QD\par We also arranged the timing to address when his blood pressure would surge.\par \par The a.m. dose of nifedipine would only last about 7 hours though and blood pressure seems to start increasing again by 3:30 in the afternoon.\par \par 2/28/2022  changed nifedipine to twice daily\par \par 9/8/2022-due to periodic dizziness and low blood pressure changed clonidine back to 0.2 3 times daily.\par \par 5/22/2023 cut clonidine to 0.1 3 times daily.\par \par Personal machine calibrated found to be accurate\par \par HTN Hx:\par -Hypertension had been well controlled until early December.2021\par -Noted that after AM meds of carvedilol and clonidine, he would see a sudden spike by noon to 180 systolic\par Then would take losartan 50 mg and BP would come down a bit but remain elevated greater than 150 through most of the rest of the day.  Evening would take the second carvedilol and clonidine.\par \par 9/3/2021 c/of burning sensation in the right lower extremity associated with some edema.  In addition, blood pressure was 170/90.\par We reduced amlodipine to 2.5 once daily.\par Remained edematous and he discontinued amlodipine entirely.\par He called back 9/7 with persistent elevation of blood pressure 182/84\par Losartan 50 mg -  added  \par \par 9/9/21   Increased clonidine from 0.1-0.2 twice daily\par              Rx hydrochlorothiazide 25 mg daily \par \par 9/23/2021 complained of heart pounding anxiety dizziness shortness of breath.  Blood pressures averaging 129/71\par Began Lexapro 10 mg p.o. daily.

## 2023-06-05 NOTE — REVIEW OF SYSTEMS
[Weight Gain (___ Lbs)] : [unfilled] ~Ulb weight gain [Dyspnea on exertion] : dyspnea during exertion [Negative] : Gastrointestinal [Weight Loss (___ Lbs)] : no recent weight loss [Chest Discomfort] : no chest discomfort [Lower Ext Edema] : no extremity edema [Palpitations] : no palpitations [Orthopnea] : no orthopnea [PND] : no PND [FreeTextEntry2] : Other than as documented here and in the HPI, the thirteen point ROS is negative [de-identified] : lightheaded

## 2023-06-05 NOTE — HISTORY OF PRESENT ILLNESS
[FreeTextEntry1] : Mr. STANFORD is a 77 year yo male with pmhx of HTN, HLD, CABG x 2 in 2014, PAF, prostate cancer, UC and bariatric surgery presents following several day hospitalization triggered by cute onset of nausea vomiting and vertigo type dizziness.  All of the symptoms resolved.\par He does remain dyspneic on exertion and states that this is somewhat new since his hospitalization and discharge.\par Pt states he is generally feeling well. No regular exercise but remains active. Denies any exertional chest pain, palpitations or SOB. \par Has chronic dyspnea on exertion which has not changed in several months. \par Denies any edema, orthopnea or PND. \par \par \par \par \par \par \par

## 2023-06-05 NOTE — ASSESSMENT
[FreeTextEntry1] : ECG: Sinus bradycardia 55 bpm.  QTc 459 ms.\par \par \par LAB DATA\par \par ----12/2/20---5/15/21---1/4/22--2/24/22--12/5/22--4/20/23\par Chol--155-----155-------147------NA--------165-----136\par LDL---79-------86--------80-------NA---------94------42\par HDL---47------46---------44-------NA---------41------68\par A1C--6.1----------------------------------------\par Creat.0.92---------------1.12-----1.29-----------------1.27\par \par 2/24/2022:\par Aldosterone, metanephrines, normetanephrine, plasma renin all within normal limits\par \par Lab data \par 9/22/2021:\par BUN 29/creatinine 1.33 increased from 0.94 since adding losartan 50 and HCTZ 25 mg\par \par Cardiac catheterization 6/1/2023 ([place])\par \par LMCA: Normal\par LAD: Proximal-80% with competitive flow\par Circumflex: No disease\par OM1: 70% RCA: Sequential 50% lesions\par Posterolateral branch 50%\par SVG OM1 patent\par LIMA to LAD patent\par (History of CABG x2 5/28/14)\par \par Portable Monitor Sleep Study: (3/31/22)\par MURALI (surrogate for AHI): 16.8\par OAI 2.5\par ANGELINA 0.8\par Lowest Desat 73\par \par Renal artery duplex 2/24/2022:\par There is no evidence of renal artery stenosis.\par \par Echocardiogram 9/16/2021:\par Normal LV size and function ejection fraction 60 to 65%\par Mild left atrial dilatation\par No significant valvular disease.\par \par Venous duplex lower extremity 9/23/2021:\par No evidence of DVT bilaterally\par \par 2/24/2022: Renal artery Doppler\par No evidence of renal artery stenosis \par \par extremity arterial duplex 9/16/2021:\par No evidence of any significant obstructive PAD.\par \par Carotid duplex 12/22/2022\par Left: Mild bulb: Moderate ICA: Peak 101 cm/s: 1 to 49%\par Right: Mild CCA bulb, ICA: Peak 81 cm/s: 1 to 41%\par \par Carotid study 10/30/20\par Mild plaque in the ICA and prox. ICA\par Moderate plaque in the left ECA\par Mild plaque in the distal right CCA\par Mild plaque in the right bulb\par Mild plaque in the right prox ICA\par \par Carotid study 9/20/18:\par Mild bilateral atherosclerotic plaquing. No hemodynamically significant stenosis. Left slightly worse than right.\par \par Pharmacologic stress 1/19/2023:\par Normal SPECT imaging.\par No evidence of ischemia.  Unchanged from prior study 2018\par \par Pharm. stress test 10/26/20\par Peal HR 68 bpm\par Peak /70\par Pcc. PACs, ECG negative for ischemia.\par Normal SPECT.\par EF 62%\par \par Echocardiogram 9/24/18:\par LVH with hyperdynamic left ventricular function. Ejection fraction greater than 70%.\par Mild subaortic outflow tract obstruction.\par Mild mitral and tricuspid insufficiency which appears less than on the prior echocardiogram.\par \par IMPRESSION:\par 1.  Status post possible non-ST elevation MI seen in the face of hypokalemia, acute on chronic kidney disease, with presenting symptoms of nausea and vertigo.\par \par 2.  Benign essential hypertension, had been difficult to control now seems to be\par      Renal artery stenosis and other causes of secondary hypertension seems to be excluded \par \par 3.hypokalemia possibly was due to hydrochlorothiazide and/or the vomiting.\par \par 4.stable mild bilateral atherosclerotic carotid disease unchanged from prior, no obstruction. \par    No evidence of any significant lower extremity PAD and the burning in his calf muscles has resolved.\par \par 5. Rare clinical recurrence of palps c/w his hx of paroxysmal atrial fibrillation on low dose sotalol. \par     Tolerating Xarelto and compliant with this. \par \par 6.  Hyperlipidemia with marked improvement in LDL from 94-68 with change to rosuvastatin 20.\par \par 7.  EKG today shows sinus bradycardia likely related to Carvedilol and Clonidine, unchanged from prior. Carvedilol was decreased to 3.125 BID on his last office visit due to bradycardia.  \par \par 8  obstructive sleep apnea-patient cannot tolerate the mask and is electing to live without it understanding the consequent\par           \par 9.  Marked QTc, (seen at time of profound hypokalemia) resulted in discontinuation of sotalol for now.\par \par Plan:\par 1.  Patient controlled with nifedipine twice daily, carvedilol 3.125 twice daily, clonidine 0.1 3 times daily\par \par 2.  Follow-up  metabolic panel to reassess renal function\par \par 3.  Choosing not to use CPAP for his obstructive sleep apnea\par \par 4.  Continue rosuvastatin 20 mg daily repeat blood work in 3 months\par \par 5. Continue Xarelto for thromboembolic ppx given PAF. \par \par 6.  We will need to consider that increasing edema and shortness of breath may be due to the discontinuation of hydrochlorothiazide.\par Pt knows to call if any new or worsening symptoms. In the absence of any cardiac associated symptoms clinical follow up can be made in 1 week\par

## 2023-06-05 NOTE — PHYSICAL EXAM
[FreeTextEntry1] :                    Well appearing and nourished with moderate alopecia, mild obesity  no obvious deformities or distress.\par \par Eyes: \par No conjunctival injection and no xanthelasmas.\par HEENT: \par Normocephalic.Normal oral mucosa. No pallor or cyanosis\par Neck: \par No jugular venous distension. with normal A and V wave forms. No palpable adenopathy.\par Cardiovascular: \par Normal rate and rhythm with normal S1, S2 and a grade 3/6 systolic murmur. Distal arterial pulses are normal.  Trace to 1+ bilateral pretibial peripheral edema.\par Pulmonary: \par Lungs are clear to auscultation and percussion. Normal respiratory pattern without any accessory muscle use\par Abdomen: \par Soft, non-tender ; no palpable organomegaly or masses.\par Extremities:\par No digital clubbing, cyanosis or ischemic changes.\par Skin: \par No skin lesions, rashes, ulcers or xanthomas.\par Psychiatric: \par Alert and oriented to person, place and time. Appropriate mood and affect.

## 2023-06-09 ENCOUNTER — INPATIENT (INPATIENT)
Facility: HOSPITAL | Age: 78
LOS: 1 days | Discharge: HOME | DRG: 378 | End: 2023-06-11
Attending: INTERNAL MEDICINE | Admitting: HOSPITALIST
Payer: MEDICARE

## 2023-06-09 ENCOUNTER — NON-APPOINTMENT (OUTPATIENT)
Age: 78
End: 2023-06-09

## 2023-06-09 VITALS
HEART RATE: 58 BPM | TEMPERATURE: 98 F | RESPIRATION RATE: 18 BRPM | SYSTOLIC BLOOD PRESSURE: 94 MMHG | DIASTOLIC BLOOD PRESSURE: 52 MMHG | WEIGHT: 175.05 LBS | HEIGHT: 64 IN | OXYGEN SATURATION: 98 %

## 2023-06-09 DIAGNOSIS — D64.9 ANEMIA, UNSPECIFIED: ICD-10-CM

## 2023-06-09 LAB
ABO RH CONFIRMATION: SIGNIFICANT CHANGE UP
ALBUMIN SERPL ELPH-MCNC: 3.5 G/DL — SIGNIFICANT CHANGE UP (ref 3.3–5.2)
ALP SERPL-CCNC: 114 U/L — SIGNIFICANT CHANGE UP (ref 40–120)
ALT FLD-CCNC: 28 U/L — SIGNIFICANT CHANGE UP
ANION GAP SERPL CALC-SCNC: 12 MMOL/L — SIGNIFICANT CHANGE UP (ref 5–17)
AST SERPL-CCNC: 24 U/L — SIGNIFICANT CHANGE UP
BASOPHILS # BLD AUTO: 0.06 K/UL — SIGNIFICANT CHANGE UP (ref 0–0.2)
BASOPHILS NFR BLD AUTO: 0.6 % — SIGNIFICANT CHANGE UP (ref 0–2)
BILIRUB SERPL-MCNC: <0.2 MG/DL — LOW (ref 0.4–2)
BLD GP AB SCN SERPL QL: SIGNIFICANT CHANGE UP
BUN SERPL-MCNC: 21.8 MG/DL — HIGH (ref 8–20)
CALCIUM SERPL-MCNC: 8.6 MG/DL — SIGNIFICANT CHANGE UP (ref 8.4–10.5)
CHLORIDE SERPL-SCNC: 108 MMOL/L — SIGNIFICANT CHANGE UP (ref 96–108)
CO2 SERPL-SCNC: 19 MMOL/L — LOW (ref 22–29)
CREAT SERPL-MCNC: 1.5 MG/DL — HIGH (ref 0.5–1.3)
EGFR: 48 ML/MIN/1.73M2 — LOW
EOSINOPHIL # BLD AUTO: 0.2 K/UL — SIGNIFICANT CHANGE UP (ref 0–0.5)
EOSINOPHIL NFR BLD AUTO: 1.9 % — SIGNIFICANT CHANGE UP (ref 0–6)
FERRITIN SERPL-MCNC: 35 NG/ML — SIGNIFICANT CHANGE UP (ref 30–400)
GLUCOSE SERPL-MCNC: 85 MG/DL — SIGNIFICANT CHANGE UP (ref 70–99)
HAPTOGLOB SERPL-MCNC: 174 MG/DL — SIGNIFICANT CHANGE UP (ref 34–200)
HCT VFR BLD CALC: 22 % — LOW (ref 39–50)
HGB BLD-MCNC: 7.1 G/DL — LOW (ref 13–17)
IMM GRANULOCYTES NFR BLD AUTO: 0.9 % — SIGNIFICANT CHANGE UP (ref 0–0.9)
INR BLD: 1.64 RATIO — HIGH (ref 0.88–1.16)
IRON SATN MFR SERPL: 19 UG/DL — LOW (ref 59–158)
IRON SATN MFR SERPL: 7 % — LOW (ref 16–55)
LYMPHOCYTES # BLD AUTO: 1.99 K/UL — SIGNIFICANT CHANGE UP (ref 1–3.3)
LYMPHOCYTES # BLD AUTO: 18.8 % — SIGNIFICANT CHANGE UP (ref 13–44)
MAGNESIUM SERPL-MCNC: 1.8 MG/DL — SIGNIFICANT CHANGE UP (ref 1.8–2.6)
MCHC RBC-ENTMCNC: 32.3 GM/DL — SIGNIFICANT CHANGE UP (ref 32–36)
MCHC RBC-ENTMCNC: 32.6 PG — SIGNIFICANT CHANGE UP (ref 27–34)
MCV RBC AUTO: 100.9 FL — HIGH (ref 80–100)
MONOCYTES # BLD AUTO: 1.07 K/UL — HIGH (ref 0–0.9)
MONOCYTES NFR BLD AUTO: 10.1 % — SIGNIFICANT CHANGE UP (ref 2–14)
NEUTROPHILS # BLD AUTO: 7.18 K/UL — SIGNIFICANT CHANGE UP (ref 1.8–7.4)
NEUTROPHILS NFR BLD AUTO: 67.7 % — SIGNIFICANT CHANGE UP (ref 43–77)
OB PNL STL: NEGATIVE — SIGNIFICANT CHANGE UP
PLATELET # BLD AUTO: 409 K/UL — HIGH (ref 150–400)
POTASSIUM SERPL-MCNC: 4.5 MMOL/L — SIGNIFICANT CHANGE UP (ref 3.5–5.3)
POTASSIUM SERPL-SCNC: 4.5 MMOL/L — SIGNIFICANT CHANGE UP (ref 3.5–5.3)
PROT SERPL-MCNC: 6.1 G/DL — LOW (ref 6.6–8.7)
PROTHROM AB SERPL-ACNC: 19.1 SEC — HIGH (ref 10.5–13.4)
RBC # BLD: 2.18 M/UL — LOW (ref 4.2–5.8)
RBC # FLD: 16.9 % — HIGH (ref 10.3–14.5)
SODIUM SERPL-SCNC: 139 MMOL/L — SIGNIFICANT CHANGE UP (ref 135–145)
TIBC SERPL-MCNC: 282 UG/DL — SIGNIFICANT CHANGE UP (ref 220–430)
TRANSFERRIN SERPL-MCNC: 197 MG/DL — SIGNIFICANT CHANGE UP (ref 180–329)
TROPONIN T SERPL-MCNC: <0.01 NG/ML — SIGNIFICANT CHANGE UP (ref 0–0.06)
WBC # BLD: 10.6 K/UL — HIGH (ref 3.8–10.5)
WBC # FLD AUTO: 10.6 K/UL — HIGH (ref 3.8–10.5)

## 2023-06-09 PROCEDURE — 71045 X-RAY EXAM CHEST 1 VIEW: CPT | Mod: 26

## 2023-06-09 PROCEDURE — 99223 1ST HOSP IP/OBS HIGH 75: CPT

## 2023-06-09 PROCEDURE — 99285 EMERGENCY DEPT VISIT HI MDM: CPT | Mod: GC

## 2023-06-09 PROCEDURE — 93010 ELECTROCARDIOGRAM REPORT: CPT

## 2023-06-09 RX ORDER — ESCITALOPRAM OXALATE 10 MG/1
10 TABLET, FILM COATED ORAL DAILY
Refills: 0 | Status: DISCONTINUED | OUTPATIENT
Start: 2023-06-09 | End: 2023-06-11

## 2023-06-09 RX ORDER — SUCRALFATE 1 G
1 TABLET ORAL
Refills: 0 | Status: DISCONTINUED | OUTPATIENT
Start: 2023-06-09 | End: 2023-06-11

## 2023-06-09 RX ORDER — PANTOPRAZOLE SODIUM 20 MG/1
80 TABLET, DELAYED RELEASE ORAL ONCE
Refills: 0 | Status: DISCONTINUED | OUTPATIENT
Start: 2023-06-09 | End: 2023-06-09

## 2023-06-09 RX ORDER — NIFEDIPINE 30 MG
60 TABLET, EXTENDED RELEASE 24 HR ORAL
Refills: 0 | Status: DISCONTINUED | OUTPATIENT
Start: 2023-06-09 | End: 2023-06-09

## 2023-06-09 RX ORDER — RIVAROXABAN 15 MG-20MG
15 KIT ORAL
Refills: 0 | Status: DISCONTINUED | OUTPATIENT
Start: 2023-06-09 | End: 2023-06-11

## 2023-06-09 RX ORDER — PANTOPRAZOLE SODIUM 20 MG/1
40 TABLET, DELAYED RELEASE ORAL EVERY 12 HOURS
Refills: 0 | Status: DISCONTINUED | OUTPATIENT
Start: 2023-06-09 | End: 2023-06-11

## 2023-06-09 RX ORDER — LEVOTHYROXINE SODIUM 125 MCG
125 TABLET ORAL DAILY
Refills: 0 | Status: DISCONTINUED | OUTPATIENT
Start: 2023-06-09 | End: 2023-06-11

## 2023-06-09 RX ORDER — ASPIRIN/CALCIUM CARB/MAGNESIUM 324 MG
81 TABLET ORAL DAILY
Refills: 0 | Status: DISCONTINUED | OUTPATIENT
Start: 2023-06-09 | End: 2023-06-11

## 2023-06-09 RX ORDER — CARVEDILOL PHOSPHATE 80 MG/1
3.12 CAPSULE, EXTENDED RELEASE ORAL EVERY 12 HOURS
Refills: 0 | Status: DISCONTINUED | OUTPATIENT
Start: 2023-06-09 | End: 2023-06-11

## 2023-06-09 RX ADMIN — Medication 81 MILLIGRAM(S): at 18:00

## 2023-06-09 RX ADMIN — Medication 1 GRAM(S): at 18:01

## 2023-06-09 RX ADMIN — Medication 1 GRAM(S): at 23:33

## 2023-06-09 RX ADMIN — PANTOPRAZOLE SODIUM 40 MILLIGRAM(S): 20 TABLET, DELAYED RELEASE ORAL at 18:00

## 2023-06-09 RX ADMIN — CARVEDILOL PHOSPHATE 3.12 MILLIGRAM(S): 80 CAPSULE, EXTENDED RELEASE ORAL at 18:01

## 2023-06-09 NOTE — CONSULT NOTE ADULT - SUBJECTIVE AND OBJECTIVE BOX
Patient is a 78y old  Male who presents with a chief complaint of anemia     HPI: 77 y/o M with PMHx HTN, HLD, CABG, pAF, prostate CA, UC, sleeve gastrectomy, polymyalgia rheumatica presents to ED sent in by his cardiologist for abnormal Hgb on outpatient labs. Per cardiology note, Hgb 7.6 from 6/08/2023. Patient notes increased MACHADO for past 1 week. Patient recently was hospitalized in [place] 5/30-6/2/23, he presented with acute onset of nausea, vomiting and vertigo, was found to be hypertensive, hypokalemic with BRUNILDA, troponins were elevated and he underwent a cardiac catheterization. He notes in the hospital he was on plavix and heparin, he is unsure if xarelto was stopped. Normally he takes Xarelto and aspirin. He notes after discharge he had dark black tar like stools for 2 days (about 3 episodes). He states this has now resolved. He reports normal brown BM every day since that episode, last BM this morning. He notes a history of ulcerative colitis (on Sulfasalazine), polymyalgia rheumatica (on prednisone 2mg QD), sleeve gastrectomy. He takes omeprazole 20mg at home daily. He follows with Dr. Stacy, he saw him several weeks ago for worsening reflux, he was started on Carafate and had abdominal imaging. His last EGD/colonoscopy were in March 2023 and normal per patient. No prior blood transfusion or known history of anemia. Denies abdominal pain, hematemesis, hematochezia, melena (currently).   Chart review shows 4/2021- Hgb 10.9  He was initially hypotensive in the ED which has improved. Labs remarkable for Hgb 7.1, INR 1.64, creatinine 1.50, total iron 19, FOBT negative. GINA performed by ER reported brown stool. He is currently receiving 1 u pRBC.         PAST MEDICAL & SURGICAL HISTORY:      Allergies    No Known Allergies    Intolerances        MEDICATIONS  (STANDING):  pantoprazole  Injectable 80 milliGRAM(s) IV Push Once    MEDICATIONS  (PRN):      Social History:    Marital Status:  (   )    (   ) Single    (   )    (  )   Occupation:   Lives with: (  ) alone  (  ) children   (  ) spouse   (  ) parents  (  ) other    Substance Use (street drugs): ( x ) never used  (  ) other:  Tobacco Usage:  (   ) never smoked   (   ) former smoker   ( x  ) current smoker  (     ) pack year  (        ) last cigarette date  Alcohol Usage: denies   Sexual History:     Family History   IBD (  ) Yes   (  ) No  GI Malignancy (  )  Yes    ( x ) No    Health Management     Last Colonoscopy -      (     ) Advanced Directives: (     ) None    (      ) DNR    (     ) DNI    (     ) Health Care Proxy:       REVIEW OF SYSTEMS:   General: Negative  HEENT: Negative  CV: Negative  Respiratory: Negative  GI: See HPI  : Negative  MSK: Negative  Hematologic: Negative  Skin: Negative      Vital Signs Last 24 Hrs  T(C): 36.6 (09 Jun 2023 15:52), Max: 36.8 (09 Jun 2023 11:51)  T(F): 97.9 (09 Jun 2023 15:52), Max: 98.2 (09 Jun 2023 11:51)  HR: 60 (09 Jun 2023 15:52) (51 - 60)  BP: 123/64 (09 Jun 2023 15:52) (94/52 - 123/64)  BP(mean): --  RR: 18 (09 Jun 2023 15:52) (18 - 18)  SpO2: 98% (09 Jun 2023 15:52) (96% - 98%)    Parameters below as of 09 Jun 2023 15:52  Patient On (Oxygen Delivery Method): room air        PHYSICAL EXAM:   GENERAL:  No acute distress  HEENT:  NC/AT, conjunctiva clear, sclera anicteric  CHEST:  No increased effort, breath sounds clear  HEART:  Regular rate  ABDOMEN:  Soft, non-tender, non-distended, normoactive bowel sounds, no rebound or guarding  EXTREMITIES: No edema  SKIN:  Warm, dry  NEURO:  Calm, cooperative        LABS:                        7.1    10.60 )-----------( 409      ( 09 Jun 2023 12:25 )             22.0     06-09    139  |  108  |  21.8<H>  ----------------------------<  85  4.5   |  19.0<L>  |  1.50<H>    Ca    8.6      09 Jun 2023 12:25  Mg     1.8     06-09    TPro  6.1<L>  /  Alb  3.5  /  TBili  <0.2<L>  /  DBili  x   /  AST  24  /  ALT  28  /  AlkPhos  114  06-09    PT/INR - ( 09 Jun 2023 12:25 )   PT: 19.1 sec;   INR: 1.64 ratio             LIVER FUNCTIONS - ( 09 Jun 2023 12:25 )  Alb: 3.5 g/dL / Pro: 6.1 g/dL / ALK PHOS: 114 U/L / ALT: 28 U/L / AST: 24 U/L / GGT: x             RADIOLOGY & ADDITIONAL TESTS:

## 2023-06-09 NOTE — ED ADULT NURSE NOTE - NSFALLUNIVINTERV_ED_ALL_ED
Bed/Stretcher in lowest position, wheels locked, appropriate side rails in place/Call bell, personal items and telephone in reach/Instruct patient to call for assistance before getting out of bed/chair/stretcher/Non-slip footwear applied when patient is off stretcher/[place] to call system/Physically safe environment - no spills, clutter or unnecessary equipment/Purposeful proactive rounding/Room/bathroom lighting operational, light cord in reach

## 2023-06-09 NOTE — H&P ADULT - HISTORY OF PRESENT ILLNESS
78M who presented to his cardiologist with a one week history of dyspnea on exertion and referred to the hospital with outpatient laboratory studies noting anemia. The patient denied any abdominal pain or nausea but did note episodes of dark stool. He reported that he had recently returned from [place] where he was hospitalized with nausea and vomiting and elevated troponin levels with cardiac catheterization noting patent graft. The patient also reported that he had been evaluated by his gastroenterologist three months prior with a negative endoscopy and colonoscopy and started on sulcralfate. The patient reported a history of ulcerative colitis but denied any history of rectal bleeding. The patient denied       HPI: 77 y/o M with PMHx HTN, HLD, CABG, pAF, prostate CA, UC, sleeve gastrectomy, polymyalgia rheumatica presents to ED sent in by his cardiologist for abnormal Hgb on outpatient labs. Per cardiology note, Hgb 7.6 from 6/08/2023. Patient notes increased MACHADO for past 1 week. Patient recently was hospitalized in [place] 5/30-6/2/23, he presented with acute onset of nausea, vomiting and vertigo, was found to be hypertensive, hypokalemic with BRUNILDA, troponins were elevated and he underwent a cardiac catheterization. He notes in the hospital he was on plavix and heparin, he is unsure if xarelto was stopped. Normally he takes Xarelto and aspirin. He notes after discharge he had dark black tar like stools for 2 days (about 3 episodes). He states this has now resolved. He reports normal brown BM every day since that episode, last BM this morning. He notes a history of ulcerative colitis (on Sulfasalazine), polymyalgia rheumatica (on prednisone 2mg QD), sleeve gastrectomy. He takes omeprazole 20mg at home daily. He follows with Dr. Stacy, he saw him several weeks ago for worsening reflux, he was started on Carafate and had abdominal imaging. His last EGD/colonoscopy were in March 2023 and normal per patient. No prior blood transfusion or known history of anemia. Denies abdominal pain, hematemesis, hematochezia, melena (currently).   Chart review shows 4/2021- Hgb 10.9  He was initially hypotensive in the ED which has improved. Labs remarkable for Hgb 7.1, INR 1.64, creatinine 1.50, total iron 19, FOBT negative. GINA performed by ER reported brown stool. He is currently receiving 1 u pRBC.  78M who presented to his cardiologist with a one week history of dyspnea on exertion and referred to the hospital with outpatient laboratory studies noting anemia. The patient denied any abdominal pain or nausea but did note episodes of dark stool. He reported that he had recently returned from [place] where he was hospitalized with nausea and vomiting and elevated troponin levels with cardiac catheterization noting patent graft. The patient also reported that he had been evaluated by his gastroenterologist three months prior with a negative endoscopy and colonoscopy and started on sulcralfate. The patient reported a history of ulcerative colitis but denied any history of rectal bleeding. The patient denied any chest pain or palpitations. There were no recent fevers, chills, or sick contacts.

## 2023-06-09 NOTE — ED PROVIDER NOTE - CLINICAL SUMMARY MEDICAL DECISION MAKING FREE TEXT BOX
77 y/o M with PMHx HTN, HLD, PAF on Xarelto, prostate cancer, UC, CAD s/p CABG and stents, recent cardiac cath in [place] 10 days ago for NSTEMI c/b prolonged qT to 700 and hypokalemia with subsequent discontinuation of Sotalol and Hydrochlorothiazide, who presents to the ED for shortness of breath for the past two weeks and low hemoglobin on outpatient labwork. Will check hemoglobin here, stool occult, consult GI, reassess.

## 2023-06-09 NOTE — ED PROVIDER NOTE - ATTENDING CONTRIBUTION TO CARE
77 y/o M with PMHx HTN, HLD, PAF on Xarelto, prostate cancer, UC, CAD s/p CABG and stents, recent cardiac cath in [place] 10 days ago for NSTEMI c/b prolonged qT to 700 and hypokalemia with subsequent discontinuation of Sotalol and Hydrochlorothiazide, who presents to the ED for shortness of breath for the past two weeks and low hemoglobin on outpatient labwork. Patient denies any current rectal bleeding or melena however states that he had black stools about two weeks ago which resolved. States that during this interval of time he has also been having shortness of breath but that it has not gotten worse. States that he was sent in by his PCP after labwork showed low hemoglobin. Denies any history of anemia or GI bleeds. Denies any recent illnesses, fevers, chills, NVD, abdominal pain, or any other complaints at this time.    + anemia here. neg FOBT. seen by GI who reccs admit. transfusion started and consented. stable for admission to dr morrison

## 2023-06-09 NOTE — ED PROVIDER NOTE - OBJECTIVE STATEMENT
79 y/o M with PMHx HTN, HLD, PAF on Xarelto, prostate cancer, UC, CAD s/p CABG and stents, recent cardiac cath in [place] 10 days ago for NSTEMI c/b prolonged qT to 700 and hypokalemia with subsequent discontinuation of Sotalol and Hydrochlorothiazide, who presents to the ED for shortness of breath for the past two weeks and low hemoglobin on outpatient labwork. Patient denies any current rectal bleeding or melena however states that he had black stools about two weeks ago which resolved. States that during this interval of time he has also been having shortness of breath but that it has not gotten worse. States that he was sent in by his PCP after labwork showed low hemoglobin. Denies any history of anemia or GI bleeds. Denies any recent illnesses, fevers, chills, NVD, abdominal pain, or any other complaints at this time.    Joshua

## 2023-06-09 NOTE — CONSULT NOTE ADULT - PROBLEM SELECTOR RECOMMENDATION 9
Anemia unclear etiology, dark stool episodes (now resolved), GINA performed by ER reported brown stool. Initial Hgb 7.1, - FOBT. 1 u pRBC 6/9/23. Prior Hgb 10.9 (4/2021)  EGD/Colon 3/2023 with Dr. Stacy  On Xarelto (last dose 6/8/23), aspirin 81 mg (last dose 6/7/23 PM), prednisone 2mg       - Trend CBC, transfuse as needed. Monitor for signs of bleeding. Avoid NSAIDs  - Clear liquid diet as tolerated  - IV PPI BID for GI mucosal cytoprotection  - Will consider endoscopic intervention depending on hospital course   - Further anemia workup per primary team   - Please obtain records from hospital in [place]   _________________________________________________________________  Assessment and recommendations are final when note is signed by the attending physician.

## 2023-06-09 NOTE — H&P ADULT - NSHPPHYSICALEXAM_GEN_ALL_CORE
Vital Signs Last 24 Hrs  T(C): 36.6 (09 Jun 2023 15:52), Max: 36.8 (09 Jun 2023 11:51)  T(F): 97.9 (09 Jun 2023 15:52), Max: 98.2 (09 Jun 2023 11:51)  HR: 60 (09 Jun 2023 15:52) (51 - 60)  BP: 123/64 (09 Jun 2023 15:52) (94/52 - 123/64)  BP(mean): --  RR: 18 (09 Jun 2023 15:52) (18 - 18)  SpO2: 98% (09 Jun 2023 15:52) (96% - 98%)    Parameters below as of 09 Jun 2023 15:52  Patient On (Oxygen Delivery Method): room air    General appearance: No acute distress, Awake, Alert  HEENT: Normocephalic, Atraumatic, Conjunctiva clear, EOMI  Neck: Supple, No JVD, No tenderness  Lungs: Breath sound equal bilaterally, No wheezes, No rales  Cardiovascular: S1S2, Regular rhythm  Abdomen: Soft, Nontender, Nondistended, No guarding/rebound, Positive bowel sounds  Extremities: No clubbing, No cyanosis, No calf tenderness, Pedal edema  Neuro: Strength equal bilaterally, No tremors  Psychiatric: Appropriate mood, Normal affect

## 2023-06-09 NOTE — ED PROVIDER NOTE - NSICDXPASTMEDICALHX_GEN_ALL_CORE_FT
PAST MEDICAL HISTORY:  HLD (hyperlipidemia)     HTN (hypertension)     NSTEMI (non-ST elevation myocardial infarction)     S/P CABG x 3

## 2023-06-09 NOTE — H&P ADULT - ASSESSMENT
78M with a history of atrial fibrillation, coronary artery disease with bypass surgery, hypertension, ulcerative colitis, hypothyroidism, prostate cancer, and polymyalgia rheumatic.    Anemia - Transfusion of packed red blood cells in progress. To continue monitoring hemoglobin levels. The patient had a recent endoscopy and colonoscopy without significant findings. To continue on pantoprazole and sucralfate. Gastroenterology consultation noted. For a clear liquid diet.    Atrial fibrillation - On carvedilol. Continue on rivaroxaban for anticoagulation. Sotalol was discontinued in [place] due to a prolonged QTc. QTc within normal limits.    Hypothyroidism - On levothyroxine.    Ulcerative colitis / Polymyalgia rheumatica - On daily prednisone.    Hypertension - Close blood pressure monitoring. Initial blood pressure was low. To continue on carvedilol for now and hold nifedipine.

## 2023-06-09 NOTE — ED PROVIDER NOTE - PHYSICAL EXAMINATION
General: well appearing, NAD  Head: NC, AT  EENT: no scleral icterus  Cardiac: RRR, no apparent murmurs, no lower extremity edema  Respiratory: CTABL, no respiratory distress   Abdomen: soft, ND, NT, nonperitonitic  MSK/Vascular: soft compartments, warm extremities  Neuro: sensation to light touch intact  Psych: calm, cooperative

## 2023-06-09 NOTE — ED ADULT NURSE NOTE - OBJECTIVE STATEMENT
Pt comes in referred from MD office due to low hemoglobin result. Pt denies bloody stools or hematemesis. Endorses dizziness, describes his dizziness as he feels like "I'm gonna pass out." Hypotensive to 90's systolic in triage. Pt sinus zoe on monitor to low 50's and satting 100% on RA.

## 2023-06-09 NOTE — CONSULT NOTE ADULT - NS ATTEND AMEND GEN_ALL_CORE FT
78-year-old man with a history of hypertension, dyslipidemia, CABG, chronic kidney disease, ulcerative colitis, sleeve gastrectomy, polymyalgia rheumatica, with recent admission in [place] and was noted to have hypokalemia and then subsequently cardiac cath.  He has been on Xarelto and aspirin.  He had GI bleeding.  That seems to have stopped.  We do not have any records of his last hemoglobin.  Appears to be doing okay.  Last EGD and colonoscopy with Dr. Stacy in March.  Colonoscopy revealed mild chronic colitis in transverse colon but the mucosal appearance was fine.  Previous gastric surgery.  He had a CT abdomen 2023 and there was pathology.  Patient is on sulfasalazine 500 mg 3 times daily.  He is on prednisone 2 mg daily for polymyalgia    Acute blood loss anemia: Most likely there is a chance that that he may have developed some ulcerations related to recent admission and he was on anticoagulation with Xarelto.  Although he is on PPI at home, there is a still possibility of GI bleeding which has most likely stopped.  Will recommend to do the PPI therapy twice daily.  Advance to clear liquid diet.  Monitor the blood count.  If there is no further drop and there is a adequate response to the packed RBC transfusion, patient will need to follow-up with regular gastroenterologist Dr. Desire Stacy.

## 2023-06-09 NOTE — H&P ADULT - NSHPSOCIALHISTORY_GEN_ALL_CORE
Positive for tobacco use  Denied alcohol or illicit drug use  Lives at home wit his wife    PSH: CABG, Prostatectomy  Family History: Parents without history of colon cancer

## 2023-06-09 NOTE — CONSULT NOTE ADULT - ASSESSMENT
77 y/o M with PMHx HTN, HLD, CABG, pAF, prostate CA, UC, sleeve gastrectomy, polymyalgia rheumatica presents to ED for anemia, dark stools 1 week ago now resolved

## 2023-06-09 NOTE — ED PROVIDER NOTE - NS ED ROS FT
Constitutional: no fever, no chills  Head: NC, AT   Eyes: no redness   ENMT: no nasal congestion/drainage, no sore throat   CV: no chest pain, no edema  Resp: no cough, + dyspnea  GI: no abdominal pain, no nausea, no vomiting, no diarrhea  : no dysuria  Skin: no lesions, no rashes   Neuro: no LOC, no headache, no sensory deficits

## 2023-06-10 LAB
ANION GAP SERPL CALC-SCNC: 9 MMOL/L — SIGNIFICANT CHANGE UP (ref 5–17)
BUN SERPL-MCNC: 14.5 MG/DL — SIGNIFICANT CHANGE UP (ref 8–20)
CALCIUM SERPL-MCNC: 8.3 MG/DL — LOW (ref 8.4–10.5)
CHLORIDE SERPL-SCNC: 111 MMOL/L — HIGH (ref 96–108)
CO2 SERPL-SCNC: 23 MMOL/L — SIGNIFICANT CHANGE UP (ref 22–29)
CREAT SERPL-MCNC: 1.27 MG/DL — SIGNIFICANT CHANGE UP (ref 0.5–1.3)
EGFR: 59 ML/MIN/1.73M2 — LOW
GLUCOSE SERPL-MCNC: 84 MG/DL — SIGNIFICANT CHANGE UP (ref 70–99)
HCT VFR BLD CALC: 25.1 % — LOW (ref 39–50)
HCV AB S/CO SERPL IA: 0.09 S/CO — SIGNIFICANT CHANGE UP (ref 0–0.99)
HCV AB SERPL-IMP: SIGNIFICANT CHANGE UP
HGB BLD-MCNC: 8.1 G/DL — LOW (ref 13–17)
MAGNESIUM SERPL-MCNC: 1.7 MG/DL — SIGNIFICANT CHANGE UP (ref 1.6–2.6)
MCHC RBC-ENTMCNC: 29.6 PG — SIGNIFICANT CHANGE UP (ref 27–34)
MCHC RBC-ENTMCNC: 32.3 GM/DL — SIGNIFICANT CHANGE UP (ref 32–36)
MCV RBC AUTO: 91.6 FL — SIGNIFICANT CHANGE UP (ref 80–100)
PLATELET # BLD AUTO: 374 K/UL — SIGNIFICANT CHANGE UP (ref 150–400)
POTASSIUM SERPL-MCNC: 4.2 MMOL/L — SIGNIFICANT CHANGE UP (ref 3.5–5.3)
POTASSIUM SERPL-SCNC: 4.2 MMOL/L — SIGNIFICANT CHANGE UP (ref 3.5–5.3)
RBC # BLD: 2.74 M/UL — LOW (ref 4.2–5.8)
RBC # FLD: 22.5 % — HIGH (ref 10.3–14.5)
SODIUM SERPL-SCNC: 143 MMOL/L — SIGNIFICANT CHANGE UP (ref 135–145)
WBC # BLD: 8.29 K/UL — SIGNIFICANT CHANGE UP (ref 3.8–10.5)
WBC # FLD AUTO: 8.29 K/UL — SIGNIFICANT CHANGE UP (ref 3.8–10.5)

## 2023-06-10 PROCEDURE — 99233 SBSQ HOSP IP/OBS HIGH 50: CPT

## 2023-06-10 PROCEDURE — 99232 SBSQ HOSP IP/OBS MODERATE 35: CPT

## 2023-06-10 RX ADMIN — Medication 2 MILLIGRAM(S): at 06:23

## 2023-06-10 RX ADMIN — Medication 1 GRAM(S): at 06:24

## 2023-06-10 RX ADMIN — Medication 1 GRAM(S): at 17:46

## 2023-06-10 RX ADMIN — CARVEDILOL PHOSPHATE 3.12 MILLIGRAM(S): 80 CAPSULE, EXTENDED RELEASE ORAL at 17:47

## 2023-06-10 RX ADMIN — PANTOPRAZOLE SODIUM 40 MILLIGRAM(S): 20 TABLET, DELAYED RELEASE ORAL at 17:46

## 2023-06-10 RX ADMIN — Medication 125 MICROGRAM(S): at 06:25

## 2023-06-10 RX ADMIN — PANTOPRAZOLE SODIUM 40 MILLIGRAM(S): 20 TABLET, DELAYED RELEASE ORAL at 06:25

## 2023-06-10 RX ADMIN — Medication 1 GRAM(S): at 13:01

## 2023-06-10 RX ADMIN — CARVEDILOL PHOSPHATE 3.12 MILLIGRAM(S): 80 CAPSULE, EXTENDED RELEASE ORAL at 06:24

## 2023-06-10 RX ADMIN — Medication 0.1 MILLIGRAM(S): at 21:59

## 2023-06-10 RX ADMIN — Medication 81 MILLIGRAM(S): at 13:00

## 2023-06-10 RX ADMIN — Medication 0.1 MILLIGRAM(S): at 06:24

## 2023-06-10 RX ADMIN — RIVAROXABAN 15 MILLIGRAM(S): KIT at 17:46

## 2023-06-10 RX ADMIN — ESCITALOPRAM OXALATE 10 MILLIGRAM(S): 10 TABLET, FILM COATED ORAL at 13:00

## 2023-06-10 RX ADMIN — Medication 1 GRAM(S): at 22:01

## 2023-06-10 RX ADMIN — Medication 0.1 MILLIGRAM(S): at 13:01

## 2023-06-10 NOTE — PROGRESS NOTE ADULT - NS ATTEND AMEND GEN_ALL_CORE FT
Patient seen and examined.  No reports of any GI bleeding.  Adequate response to the transfusion.  Tolerating diet.  If the patient continues to tolerate her diet and there is no further drop in the blood count, repair for discharge for tomorrow and follow-up with his private gastroenterologist.  Discussed with the attending hospitalist Dr. Ulrich

## 2023-06-11 ENCOUNTER — TRANSCRIPTION ENCOUNTER (OUTPATIENT)
Age: 78
End: 2023-06-11

## 2023-06-11 VITALS
OXYGEN SATURATION: 96 % | HEART RATE: 60 BPM | TEMPERATURE: 98 F | DIASTOLIC BLOOD PRESSURE: 62 MMHG | SYSTOLIC BLOOD PRESSURE: 150 MMHG | RESPIRATION RATE: 18 BRPM

## 2023-06-11 LAB
ANION GAP SERPL CALC-SCNC: 9 MMOL/L — SIGNIFICANT CHANGE UP (ref 5–17)
ANISOCYTOSIS BLD QL: SIGNIFICANT CHANGE UP
BASOPHILS # BLD AUTO: 0.04 K/UL — SIGNIFICANT CHANGE UP (ref 0–0.2)
BASOPHILS NFR BLD AUTO: 0.5 % — SIGNIFICANT CHANGE UP (ref 0–2)
BUN SERPL-MCNC: 13.6 MG/DL — SIGNIFICANT CHANGE UP (ref 8–20)
CALCIUM SERPL-MCNC: 8.3 MG/DL — LOW (ref 8.4–10.5)
CHLORIDE SERPL-SCNC: 108 MMOL/L — SIGNIFICANT CHANGE UP (ref 96–108)
CO2 SERPL-SCNC: 23 MMOL/L — SIGNIFICANT CHANGE UP (ref 22–29)
CREAT SERPL-MCNC: 1.19 MG/DL — SIGNIFICANT CHANGE UP (ref 0.5–1.3)
EGFR: 63 ML/MIN/1.73M2 — SIGNIFICANT CHANGE UP
EOSINOPHIL # BLD AUTO: 0.34 K/UL — SIGNIFICANT CHANGE UP (ref 0–0.5)
EOSINOPHIL NFR BLD AUTO: 4.4 % — SIGNIFICANT CHANGE UP (ref 0–6)
GLUCOSE SERPL-MCNC: 88 MG/DL — SIGNIFICANT CHANGE UP (ref 70–99)
HCT VFR BLD CALC: 25.3 % — LOW (ref 39–50)
HGB BLD-MCNC: 8.2 G/DL — LOW (ref 13–17)
HYPOCHROMIA BLD QL: SLIGHT — SIGNIFICANT CHANGE UP
IMM GRANULOCYTES NFR BLD AUTO: 0.3 % — SIGNIFICANT CHANGE UP (ref 0–0.9)
LYMPHOCYTES # BLD AUTO: 2.45 K/UL — SIGNIFICANT CHANGE UP (ref 1–3.3)
LYMPHOCYTES # BLD AUTO: 31.5 % — SIGNIFICANT CHANGE UP (ref 13–44)
MACROCYTES BLD QL: SIGNIFICANT CHANGE UP
MANUAL SMEAR VERIFICATION: SIGNIFICANT CHANGE UP
MCHC RBC-ENTMCNC: 29.7 PG — SIGNIFICANT CHANGE UP (ref 27–34)
MCHC RBC-ENTMCNC: 32.4 GM/DL — SIGNIFICANT CHANGE UP (ref 32–36)
MCV RBC AUTO: 91.7 FL — SIGNIFICANT CHANGE UP (ref 80–100)
MICROCYTES BLD QL: SLIGHT — SIGNIFICANT CHANGE UP
MONOCYTES # BLD AUTO: 1.09 K/UL — HIGH (ref 0–0.9)
MONOCYTES NFR BLD AUTO: 14 % — SIGNIFICANT CHANGE UP (ref 2–14)
NEUTROPHILS # BLD AUTO: 3.85 K/UL — SIGNIFICANT CHANGE UP (ref 1.8–7.4)
NEUTROPHILS NFR BLD AUTO: 49.3 % — SIGNIFICANT CHANGE UP (ref 43–77)
OVALOCYTES BLD QL SMEAR: SLIGHT — SIGNIFICANT CHANGE UP
PLAT MORPH BLD: NORMAL — SIGNIFICANT CHANGE UP
PLATELET # BLD AUTO: 415 K/UL — HIGH (ref 150–400)
POIKILOCYTOSIS BLD QL AUTO: SLIGHT — SIGNIFICANT CHANGE UP
POLYCHROMASIA BLD QL SMEAR: SIGNIFICANT CHANGE UP
POTASSIUM SERPL-MCNC: 3.9 MMOL/L — SIGNIFICANT CHANGE UP (ref 3.5–5.3)
POTASSIUM SERPL-SCNC: 3.9 MMOL/L — SIGNIFICANT CHANGE UP (ref 3.5–5.3)
RBC # BLD: 2.76 M/UL — LOW (ref 4.2–5.8)
RBC # FLD: 21.8 % — HIGH (ref 10.3–14.5)
RBC BLD AUTO: ABNORMAL
SODIUM SERPL-SCNC: 140 MMOL/L — SIGNIFICANT CHANGE UP (ref 135–145)
TARGETS BLD QL SMEAR: SLIGHT — SIGNIFICANT CHANGE UP
WBC # BLD: 7.79 K/UL — SIGNIFICANT CHANGE UP (ref 3.8–10.5)
WBC # FLD AUTO: 7.79 K/UL — SIGNIFICANT CHANGE UP (ref 3.8–10.5)

## 2023-06-11 PROCEDURE — 83010 ASSAY OF HAPTOGLOBIN QUANT: CPT

## 2023-06-11 PROCEDURE — 83550 IRON BINDING TEST: CPT

## 2023-06-11 PROCEDURE — 84484 ASSAY OF TROPONIN QUANT: CPT

## 2023-06-11 PROCEDURE — 83735 ASSAY OF MAGNESIUM: CPT

## 2023-06-11 PROCEDURE — 80048 BASIC METABOLIC PNL TOTAL CA: CPT

## 2023-06-11 PROCEDURE — 85025 COMPLETE CBC W/AUTO DIFF WBC: CPT

## 2023-06-11 PROCEDURE — 93005 ELECTROCARDIOGRAM TRACING: CPT

## 2023-06-11 PROCEDURE — 84466 ASSAY OF TRANSFERRIN: CPT

## 2023-06-11 PROCEDURE — 86901 BLOOD TYPING SEROLOGIC RH(D): CPT

## 2023-06-11 PROCEDURE — 86923 COMPATIBILITY TEST ELECTRIC: CPT

## 2023-06-11 PROCEDURE — 71045 X-RAY EXAM CHEST 1 VIEW: CPT

## 2023-06-11 PROCEDURE — 82728 ASSAY OF FERRITIN: CPT

## 2023-06-11 PROCEDURE — 86900 BLOOD TYPING SEROLOGIC ABO: CPT

## 2023-06-11 PROCEDURE — 80053 COMPREHEN METABOLIC PANEL: CPT

## 2023-06-11 PROCEDURE — 36415 COLL VENOUS BLD VENIPUNCTURE: CPT

## 2023-06-11 PROCEDURE — 82272 OCCULT BLD FECES 1-3 TESTS: CPT

## 2023-06-11 PROCEDURE — 85027 COMPLETE CBC AUTOMATED: CPT

## 2023-06-11 PROCEDURE — 86850 RBC ANTIBODY SCREEN: CPT

## 2023-06-11 PROCEDURE — 86803 HEPATITIS C AB TEST: CPT

## 2023-06-11 PROCEDURE — 99239 HOSP IP/OBS DSCHRG MGMT >30: CPT

## 2023-06-11 PROCEDURE — 36430 TRANSFUSION BLD/BLD COMPNT: CPT

## 2023-06-11 PROCEDURE — P9016: CPT

## 2023-06-11 PROCEDURE — 99285 EMERGENCY DEPT VISIT HI MDM: CPT

## 2023-06-11 PROCEDURE — 85610 PROTHROMBIN TIME: CPT

## 2023-06-11 PROCEDURE — 83540 ASSAY OF IRON: CPT

## 2023-06-11 RX ORDER — SUCRALFATE 1 G
1 TABLET ORAL
Qty: 0 | Refills: 0 | DISCHARGE
Start: 2023-06-11

## 2023-06-11 RX ORDER — CARVEDILOL PHOSPHATE 80 MG/1
1 CAPSULE, EXTENDED RELEASE ORAL
Qty: 0 | Refills: 0 | DISCHARGE
Start: 2023-06-11

## 2023-06-11 RX ORDER — RIVAROXABAN 15 MG-20MG
20 KIT ORAL
Refills: 0 | Status: DISCONTINUED | OUTPATIENT
Start: 2023-06-11 | End: 2023-06-11

## 2023-06-11 RX ORDER — LEVOTHYROXINE SODIUM 125 MCG
1 TABLET ORAL
Qty: 0 | Refills: 0 | DISCHARGE
Start: 2023-06-11

## 2023-06-11 RX ORDER — RIVAROXABAN 15 MG-20MG
1 KIT ORAL
Qty: 0 | Refills: 0 | DISCHARGE
Start: 2023-06-11

## 2023-06-11 RX ORDER — ESCITALOPRAM OXALATE 10 MG/1
1 TABLET, FILM COATED ORAL
Qty: 0 | Refills: 0 | DISCHARGE
Start: 2023-06-11

## 2023-06-11 RX ORDER — ASPIRIN/CALCIUM CARB/MAGNESIUM 324 MG
1 TABLET ORAL
Qty: 0 | Refills: 0 | DISCHARGE
Start: 2023-06-11

## 2023-06-11 RX ORDER — PANTOPRAZOLE SODIUM 20 MG/1
40 TABLET, DELAYED RELEASE ORAL
Qty: 60 | Refills: 0
Start: 2023-06-11 | End: 2023-07-10

## 2023-06-11 RX ADMIN — PANTOPRAZOLE SODIUM 40 MILLIGRAM(S): 20 TABLET, DELAYED RELEASE ORAL at 05:42

## 2023-06-11 RX ADMIN — Medication 0.1 MILLIGRAM(S): at 05:39

## 2023-06-11 RX ADMIN — Medication 1 GRAM(S): at 05:40

## 2023-06-11 RX ADMIN — Medication 125 MICROGRAM(S): at 05:39

## 2023-06-11 RX ADMIN — CARVEDILOL PHOSPHATE 3.12 MILLIGRAM(S): 80 CAPSULE, EXTENDED RELEASE ORAL at 05:38

## 2023-06-11 RX ADMIN — Medication 2 MILLIGRAM(S): at 05:38

## 2023-06-11 NOTE — DISCHARGE NOTE PROVIDER - PROVIDER TOKENS
FREE:[LAST:[Gasteroenterologist],PHONE:[(   )    -],FAX:[(   )    -]],FREE:[LAST:[PCP],PHONE:[(   )    -],FAX:[(   )    -]]

## 2023-06-11 NOTE — PROGRESS NOTE ADULT - SUBJECTIVE AND OBJECTIVE BOX
Patient is a 78y old  Male who presents with a chief complaint of     HPI:  78M seen and examined at bedside. No overnight events. Hgb stable at 8.1g. No rectal bleeding or melena noted. PT had EGD and colonoscopy in March 2023 with Dr. Stacy. Has appt scheduled with Dr. Stacy in 2 weeks.       REVIEW OF SYSTEMS:  Constitutional: No fever, weight loss or fatigue  ENMT:  No difficulty hearing, tinnitus, vertigo; No sinus or throat pain  Respiratory: No cough, wheezing, chills or hemoptysis  Cardiovascular: No chest pain, palpitations, dizziness or leg swelling  Gastrointestinal: No abdominal or epigastric pain. No nausea, vomiting or hematemesis; No diarrhea or constipation. No melena or hematochezia.  Skin: No itching, burning, rashes or lesions   Musculoskeletal: No joint pain or swelling; No muscle, back or extremity pain    PAST MEDICAL & SURGICAL HISTORY:  S/P CABG x 3      NSTEMI (non-ST elevation myocardial infarction)      HTN (hypertension)      HLD (hyperlipidemia)          FAMILY HISTORY:      SOCIAL HISTORY:  Smoking Status: [ ] Current, [ ] Former, [ ] Never  Pack Years:  [  ] EtOH  [  ] IVDA    MEDICATIONS:  MEDICATIONS  (STANDING):  aspirin enteric coated 81 milliGRAM(s) Oral daily  carvedilol 3.125 milliGRAM(s) Oral every 12 hours  cloNIDine 0.1 milliGRAM(s) Oral three times a day  escitalopram 10 milliGRAM(s) Oral daily  levothyroxine 125 MICROGram(s) Oral daily  pantoprazole  Injectable 40 milliGRAM(s) IV Push every 12 hours  predniSONE   Tablet 2 milliGRAM(s) Oral daily  rivaroxaban 15 milliGRAM(s) Oral with dinner  sucralfate 1 Gram(s) Oral four times a day    MEDICATIONS  (PRN):      Allergies    No Known Allergies    Intolerances        Vital Signs Last 24 Hrs  T(C): 36.3 (10 Harshad 2023 10:01), Max: 36.8 (09 Jun 2023 19:30)  T(F): 97.4 (10 Harshad 2023 10:01), Max: 98.3 (09 Jun 2023 19:30)  HR: 57 (10 Harshad 2023 10:01) (51 - 66)  BP: 124/66 (10 Harshad 2023 10:01) (122/71 - 160/70)  BP(mean): --  RR: 18 (10 Harshad 2023 10:01) (18 - 18)  SpO2: 96% (10 Harshad 2023 10:01) (96% - 100%)    Parameters below as of 10 Harshad 2023 10:01  Patient On (Oxygen Delivery Method): room air            PHYSICAL EXAM:    General:  in no acute distress  HEENT: MMM, conjunctiva and sclera clear  Gastrointestinal: Soft, non-tender non-distended; Normal bowel sounds; No rebound or guarding  Extremities: Normal range of motion, No clubbing, cyanosis or edema  Neurological: Alert and oriented x3  Skin: Warm and dry. No obvious rash      LABS:                        8.1    8.29  )-----------( 374      ( 10 Harshad 2023 07:30 )             25.1     10 Harshad 2023 07:30    143    |  111    |  14.5   ----------------------------<  84     4.2     |  23.0   |  1.27     Ca    8.3        10 Harshad 2023 07:30  Mg     1.7       10 Harshad 2023 07:30    TPro  6.1    /  Alb  3.5    /  TBili  <0.2   /  DBili  x      /  AST  24     /  ALT  28     /  AlkPhos  114    / Amylase x      /Lipase x      09 Jun 2023 12:25        Iron Total, Serum: 19 ug/dL (06-09-23 @ 12:25)  Iron - Total Binding Capacity.: 282 ug/dL (06-09-23 @ 12:25)  Ferritin, Serum: 35 ng/mL (06-09-23 @ 12:25)    
[facility] Division of Hospital Medicine    Chief Complaint:  Anemia     SUBJECTIVE / OVERNIGHT EVENTS:  Pt reports no blood in stool   No shortness of breath     Patient denies chest pain, abd pain, N/V, fever, chills, dysuria or any other complaints. All remainder ROS negative.     MEDICATIONS  (STANDING):  aspirin enteric coated 81 milliGRAM(s) Oral daily  carvedilol 3.125 milliGRAM(s) Oral every 12 hours  cloNIDine 0.1 milliGRAM(s) Oral three times a day  escitalopram 10 milliGRAM(s) Oral daily  levothyroxine 125 MICROGram(s) Oral daily  pantoprazole  Injectable 40 milliGRAM(s) IV Push every 12 hours  predniSONE   Tablet 2 milliGRAM(s) Oral daily  rivaroxaban 15 milliGRAM(s) Oral with dinner  sucralfate 1 Gram(s) Oral four times a day    MEDICATIONS  (PRN):        I&O's Summary      PHYSICAL EXAM:  Vital Signs Last 24 Hrs  T(C): 36.3 (10 Harshad 2023 10:01), Max: 36.8 (09 Jun 2023 19:30)  T(F): 97.4 (10 Harshad 2023 10:01), Max: 98.3 (09 Jun 2023 19:30)  HR: 57 (10 Harshad 2023 10:01) (57 - 66)  BP: 124/66 (10 Harshad 2023 10:01) (123/64 - 160/70)  BP(mean): --  RR: 18 (10 Harshad 2023 10:01) (18 - 18)  SpO2: 96% (10 Harshad 2023 10:01) (96% - 100%)    Parameters below as of 10 Harshad 2023 10:01  Patient On (Oxygen Delivery Method): room air          CONSTITUTIONAL: NAD, sitting up in bed  ENMT: Moist oral mucosa, EOMI   RESPIRATORY: Normal respiratory effort; lungs are clear to auscultation bilaterally  CARDIOVASCULAR: Regular rate and rhythm, normal S1 and S2   ABDOMEN: Nontender to palpation, normoactive bowel sounds  MUSCULOSKELETAL:  No clubbing or cyanosis of digits   PSYCH: A+O to person, place, and time; affect appropriate  NEUROLOGY: No gross sensory or motor deficits       LABS:                        8.1    8.29  )-----------( 374      ( 10 Harshad 2023 07:30 )             25.1     06-10    143  |  111<H>  |  14.5  ----------------------------<  84  4.2   |  23.0  |  1.27    Ca    8.3<L>      10 Harshad 2023 07:30  Mg     1.7     06-10    TPro  6.1<L>  /  Alb  3.5  /  TBili  <0.2<L>  /  DBili  x   /  AST  24  /  ALT  28  /  AlkPhos  114  06-09    PT/INR - ( 09 Jun 2023 12:25 )   PT: 19.1 sec;   INR: 1.64 ratio           CARDIAC MARKERS ( 09 Jun 2023 12:25 )  x     / <0.01 ng/mL / x     / x     / x        
    Chief Complaint:  Patient is a 78y old  Male who presents with a chief complaint of Anemia (10 Harshad 2023 12:40)     78y old man patient being seen in follow-up consultation for GI bleed.     Interval Events / Subjective: Patient seen and examined at bedside  Interviewed sitting up in bed  Family present  Reports dietary tolerance  Soft brown BM this am        Review of Systems:  . Constitutional: No fever, chills,   · ENMT: no vertigo, no throat pain  . Neck: No pain or stiffness  · Respiratory and Thorax: No shortness of breath, no cough, no wheezing  · Cardiovascular: No chest pain, palpitation, no dizziness, no orthopnea,   · Gastrointestinal: see above.  · Genitourinary: No hematuria, no dysuria  · Musculoskeletal: Negative  · Neurological: no headache, no vision changes  · Psychiatric: no agitation, no anxiety  · Hematology/Lymphatics: negative  · Endocrine: negative    MEDICATIONS:   MEDICATIONS  (STANDING):  aspirin enteric coated 81 milliGRAM(s) Oral daily  carvedilol 3.125 milliGRAM(s) Oral every 12 hours  cloNIDine 0.1 milliGRAM(s) Oral three times a day  escitalopram 10 milliGRAM(s) Oral daily  levothyroxine 125 MICROGram(s) Oral daily  pantoprazole  Injectable 40 milliGRAM(s) IV Push every 12 hours  predniSONE   Tablet 2 milliGRAM(s) Oral daily  rivaroxaban 20 milliGRAM(s) Oral with dinner  sucralfate 1 Gram(s) Oral four times a day    MEDICATIONS  (PRN):      ALLERGIES:   Allergies    No Known Allergies    Intolerances        VITAL SIGNS:   Vital Signs Last 24 Hrs  T(C): 37.5 (11 Jun 2023 10:46), Max: 37.5 (11 Jun 2023 10:46)  T(F): 99.5 (11 Jun 2023 10:46), Max: 99.5 (11 Jun 2023 10:46)  HR: 51 (11 Jun 2023 10:46) (51 - 80)  BP: 135/53 (11 Jun 2023 10:46) (127/67 - 161/90)  BP(mean): --  RR: 18 (11 Jun 2023 10:46) (18 - 20)  SpO2: 95% (11 Jun 2023 10:46) (95% - 98%)    Parameters below as of 11 Jun 2023 10:46  Patient On (Oxygen Delivery Method): room air      I&O's Summary      PHYSICAL EXAM:   Appearance: Normal	  HEENT:   Normal oral mucosa, PERRL, EOMI	  Lymphatic: No lymphadenopathy  Cardiovascular: Normal S1 S2, No JVD, No  murmurs, No edema  Respiratory: clear  Psychiatry: A & O x 3, Mood & affect appropriate  Gastrointestinal:  Soft, Non-tender, + BS	  Skin: No rashes, No ecchymoses, No cyanosis	  Neurologic: Non-focal  Extremities: Normal range of motion, No clubbing, cyanosis or edema  Vascular: Peripheral pulses palpable 2+ bilaterally        LABS:  CBC Full  -  ( 11 Jun 2023 04:40 )  WBC Count : 7.79 K/uL  RBC Count : 2.76 M/uL  Hemoglobin : 8.2 g/dL  Hematocrit : 25.3 %  Platelet Count - Automated : 415 K/uL  Mean Cell Volume : 91.7 fl  Mean Cell Hemoglobin : 29.7 pg  Mean Cell Hemoglobin Concentration : 32.4 gm/dL  Auto Neutrophil # : 3.85 K/uL  Auto Lymphocyte # : 2.45 K/uL  Auto Monocyte # : 1.09 K/uL  Auto Eosinophil # : 0.34 K/uL  Auto Basophil # : 0.04 K/uL  Auto Neutrophil % : 49.3 %  Auto Lymphocyte % : 31.5 %  Auto Monocyte % : 14.0 %  Auto Eosinophil % : 4.4 %  Auto Basophil % : 0.5 %    06-11    140  |  108  |  13.6  ----------------------------<  88  3.9   |  23.0  |  1.19    Ca    8.3<L>      11 Jun 2023 04:40  Mg     1.7     06-10    TPro  6.1<L>  /  Alb  3.5  /  TBili  <0.2<L>  /  DBili  x   /  AST  24  /  ALT  28  /  AlkPhos  114  06-09    LIVER FUNCTIONS - ( 09 Jun 2023 12:25 )  Alb: 3.5 g/dL / Pro: 6.1 g/dL / ALK PHOS: 114 U/L / ALT: 28 U/L / AST: 24 U/L / GGT: x           PT/INR - ( 09 Jun 2023 12:25 )   PT: 19.1 sec;   INR: 1.64 ratio                 RADIOLOGY & ADDITIONAL STUDIES (The following images were personally reviewed):

## 2023-06-11 NOTE — DISCHARGE NOTE NURSING/CASE MANAGEMENT/SOCIAL WORK - NSDCPEFALRISK_GEN_ALL_CORE
For information on Fall & Injury Prevention, visit: [email or web address] OR  [email or web address] OR  https://www.cdc.gov/steadi/patient.html

## 2023-06-11 NOTE — PROGRESS NOTE ADULT - ASSESSMENT
78 M with a history of atrial fibrillation, coronary artery disease with bypass surgery, hypertension, ulcerative colitis, hypothyroidism, prostate cancer, and polymyalgia rheumatic presents with anemia.     Anemia   - baseline H/H unknown   - s/p PRBC transfusion   - Monitor H/H   - s/p recent EGD/ C-scope without significant findings.   - c/w PPI BID and sucralfate.   - Gastroenterology consultation appreciated    - will advance diet     Atrial fibrillation   - On carvedilol   - c/w rivaroxaban   - Sotalol was discontinued in [place] due to a prolonged QTc. QTc within normal limits.    CAD   - c/w ASA, BB and statin     Hypothyroidism   - On levothyroxine     Ulcerative colitis / Polymyalgia rheumatica   - On daily prednisone     Hypertension   - Close blood pressure monitoring   - carvedilol for now and hold nifedipine     DVT ppx - Xarelto     Home meds:   · Aspirin 81 MG Oral Tablet Delayed Release; TAKE 1 TABLET DAILY  · Carvedilol 3.125 MG Oral Tablet; TAKE 1 TABLET BY MOUTH TWICE A DAY  · cloNIDine HCl - 0.1 MG Oral Tablet; TAKE 1 TABLET 3 TIMES DAILY  · Escitalopram Oxalate 10 MG Oral Tablet; TAKE 1 TABLET DAILY  · Levothyroxine Sodium 125 MCG Oral Tablet; TAKE 1 TABLET DAILY  · NIFEdipine ER 60 MG Oral Tablet Extended Release 24 Hour; TAKE 1 TABLET Twice daily  · Omeprazole 20 MG Oral Capsule Delayed Release  · predniSONE 1 MG Oral Tablet  · Rosuvastatin Calcium 20 MG Oral Tablet; Take 1 tablet daily  · Sucralfate 1 GM Oral Tablet; TAKE 1 TABLET (1 GRAM) BY MOUTH PRIOR TO MEALS  · Xarelto 20 MG Oral Tablet; Take 1 tablet daily  
79 y/o M with PMHx HTN, HLD, CABG, pAF, prostate CA, UC, sleeve gastrectomy, polymyalgia rheumatica presents to ED for anemia, dark stools 1 week ago now resolved     A/P  Hgb stable, advance diet as tolerated  Continue to monitor H/H and transfuse as needed  Will f/u with Dr. Stacy as planned   Continue PPI BID for cytoprotection   H/O UC, continue Sulfasalazine 
77 y/o M with PMHx HTN, HLD, CABG, pAF on xarelto prostate CA, UC, sleeve gastrectomy, polymyalgia rheumatica presents to ED for anemia, dark stools 1 week ago now resolved

## 2023-06-11 NOTE — DISCHARGE NOTE NURSING/CASE MANAGEMENT/SOCIAL WORK - PATIENT PORTAL LINK FT
You can access the FollowMyHealth Patient Portal offered by [facility] by registering at the following website: [email or web address]. By joining [facility]’s FollowMyHealth portal, you will also be able to view your health information using other applications (apps) compatible with our system.

## 2023-06-11 NOTE — PROGRESS NOTE ADULT - PROBLEM SELECTOR PLAN 1
Hgb stable, tolerating diet  Continue to monitor H/H and transfuse as needed  Will f/u with Dr. Satcy 2 weeks as planned   Continue PPI BID for cytoprotection   H/O UC, continue Sulfasalazine  maybe be discharged from GI perspective

## 2023-06-11 NOTE — DISCHARGE NOTE PROVIDER - NSDCMRMEDTOKEN_GEN_ALL_CORE_FT
aspirin 81 mg oral delayed release tablet: 1 tab(s) orally once a day  carvedilol 3.125 mg oral tablet: 1 tab(s) orally every 12 hours  cloNIDine 0.1 mg oral tablet: 1 tab(s) orally 3 times a day  escitalopram 10 mg oral tablet: 1 tab(s) orally once a day  levothyroxine 125 mcg (0.125 mg) oral tablet: 1 tab(s) orally once a day  predniSONE 1 mg oral tablet: 2 tab(s) orally once a day  Protonix 40 mg oral delayed release tablet: 40 milligram(s) orally 2 times a day  rivaroxaban 20 mg oral tablet: 1 tab(s) orally once a day (before a meal)  sucralfate 1 g oral tablet: 1 tab(s) orally 4 times a day

## 2023-06-11 NOTE — DISCHARGE NOTE PROVIDER - ATTENDING DISCHARGE PHYSICAL EXAMINATION:
Vital Signs Last 24 Hrs  T(C): 37.5 (11 Jun 2023 10:46), Max: 37.5 (11 Jun 2023 10:46)  T(F): 99.5 (11 Jun 2023 10:46), Max: 99.5 (11 Jun 2023 10:46)  HR: 51 (11 Jun 2023 10:46) (51 - 80)  BP: 135/53 (11 Jun 2023 10:46) (127/67 - 161/90)  BP(mean): --  RR: 18 (11 Jun 2023 10:46) (18 - 20)  SpO2: 95% (11 Jun 2023 10:46) (95% - 98%)    Parameters below as of 11 Jun 2023 10:46  Patient On (Oxygen Delivery Method): room air    CONSTITUTIONAL: NAD, sitting up in bed  ENMT: Moist oral mucosa, EOMI   RESPIRATORY: Normal respiratory effort; lungs are clear to auscultation bilaterally  CARDIOVASCULAR: Regular rate and rhythm, normal S1 and S2   ABDOMEN: Nontender to palpation, normoactive bowel sounds  MUSCULOSKELETAL:  No clubbing or cyanosis of digits   PSYCH: A+O to person, place, and time; affect appropriate  NEUROLOGY: No gross sensory or motor deficits

## 2023-06-11 NOTE — DISCHARGE NOTE PROVIDER - NSDCFUSCHEDAPPT_GEN_ALL_CORE_FT
Lupe Richard Physician [facility]  CARDIOLOGY [address]  Scheduled Appointment: 06/15/2023

## 2023-06-11 NOTE — DISCHARGE NOTE PROVIDER - HOSPITAL COURSE
78 M with a history of atrial fibrillation, coronary artery disease with bypass surgery, hypertension, ulcerative colitis, hypothyroidism, prostate cancer, and polymyalgia rheumatic presents with anemia.   Anemia - s/p PRBC transfusion. GI consulted and reviewed records for recent EGD/ C-scope which were without significant findings. Pt was on PPI BID and sucralfate with no recurrent bleeding. Tolerating advanced diet and now H/H stable. Medically stable for DC today.

## 2023-06-11 NOTE — DISCHARGE NOTE PROVIDER - NSDCCPCAREPLAN_GEN_ALL_CORE_FT
PRINCIPAL DISCHARGE DIAGNOSIS  Diagnosis: Acute anemia  Assessment and Plan of Treatment: received PRBC transfusion. GI consulted and reviewed records for recent EGD/ C-scope done by primary Gasteroenterologist.   You were placed on pantoprazole twice a day and continued on sucralfate with no recurrent bleeding.   Please follow up with primary Gasteroenterologist

## 2023-06-11 NOTE — DISCHARGE NOTE PROVIDER - CARE PROVIDER_API CALL
Gasteroenterologist,   Phone: (   )    -  Fax: (   )    -  Follow Up Time:     PCP,   Phone: (   )    -  Fax: (   )    -  Follow Up Time:

## 2023-06-15 ENCOUNTER — OFFICE (OUTPATIENT)
Dept: URBAN - METROPOLITAN AREA CLINIC 115 | Facility: CLINIC | Age: 78
Setting detail: OPHTHALMOLOGY
End: 2023-06-15
Payer: MEDICARE

## 2023-06-15 ENCOUNTER — APPOINTMENT (OUTPATIENT)
Dept: CARDIOLOGY | Facility: CLINIC | Age: 78
End: 2023-06-15
Payer: MEDICARE

## 2023-06-15 ENCOUNTER — NON-APPOINTMENT (OUTPATIENT)
Age: 78
End: 2023-06-15

## 2023-06-15 VITALS
WEIGHT: 174 LBS | SYSTOLIC BLOOD PRESSURE: 126 MMHG | RESPIRATION RATE: 16 BRPM | HEART RATE: 61 BPM | DIASTOLIC BLOOD PRESSURE: 72 MMHG | HEIGHT: 62 IN | BODY MASS INDEX: 32.02 KG/M2

## 2023-06-15 DIAGNOSIS — E87.6 HYPOKALEMIA: ICD-10-CM

## 2023-06-15 DIAGNOSIS — H35.3124: ICD-10-CM

## 2023-06-15 DIAGNOSIS — H35.3211: ICD-10-CM

## 2023-06-15 PROBLEM — E78.5 HYPERLIPIDEMIA, UNSPECIFIED: Chronic | Status: ACTIVE | Noted: 2023-06-09

## 2023-06-15 PROBLEM — Z95.1 PRESENCE OF AORTOCORONARY BYPASS GRAFT: Chronic | Status: ACTIVE | Noted: 2023-06-09

## 2023-06-15 PROBLEM — I21.4 NON-ST ELEVATION (NSTEMI) MYOCARDIAL INFARCTION: Chronic | Status: ACTIVE | Noted: 2023-06-09

## 2023-06-15 PROBLEM — I10 ESSENTIAL (PRIMARY) HYPERTENSION: Chronic | Status: ACTIVE | Noted: 2023-06-09

## 2023-06-15 PROCEDURE — 93000 ELECTROCARDIOGRAM COMPLETE: CPT

## 2023-06-15 PROCEDURE — 99214 OFFICE O/P EST MOD 30 MIN: CPT | Mod: 25

## 2023-06-15 PROCEDURE — 92012 INTRM OPH EXAM EST PATIENT: CPT | Performed by: OPTOMETRIST

## 2023-06-15 PROCEDURE — 92250 FUNDUS PHOTOGRAPHY W/I&R: CPT | Performed by: OPTOMETRIST

## 2023-06-15 RX ORDER — SODIUM SULFATE, POTASSIUM SULFATE AND MAGNESIUM SULFATE 1.6; 3.13; 17.5 G/177ML; G/177ML; G/177ML
17.5-3.13-1.6 SOLUTION ORAL
Qty: 354 | Refills: 0 | Status: DISCONTINUED | COMMUNITY
Start: 2022-10-24 | End: 2023-06-15

## 2023-06-15 RX ORDER — NAFTIFINE HYDROCHLORIDE 20 MG/G
2 CREAM TOPICAL
Qty: 60 | Refills: 0 | Status: DISCONTINUED | COMMUNITY
Start: 2022-02-24 | End: 2023-06-15

## 2023-06-15 RX ORDER — PREDNISONE 1 MG/1
1 TABLET ORAL TWICE DAILY
Refills: 0 | Status: ACTIVE | COMMUNITY

## 2023-06-15 RX ORDER — OXICONAZOLE NITRATE 10 MG/G
1 CREAM TOPICAL
Qty: 180 | Refills: 0 | Status: DISCONTINUED | COMMUNITY
Start: 2022-02-18 | End: 2023-06-15

## 2023-06-15 RX ORDER — DESONIDE 0.5 MG/G
0.05 CREAM TOPICAL
Qty: 30 | Refills: 0 | Status: DISCONTINUED | COMMUNITY
Start: 2022-03-28 | End: 2023-06-15

## 2023-06-15 ASSESSMENT — SPHEQUIV_DERIVED: OS_SPHEQUIV: 2.125

## 2023-06-15 ASSESSMENT — TONOMETRY
OD_IOP_MMHG: 16
OS_IOP_MMHG: 17

## 2023-06-15 ASSESSMENT — REFRACTION_AUTOREFRACTION
OS_CYLINDER: -2.75
OD_AXIS: 180
OS_AXIS: 082
OD_CYLINDER: -1.75
OS_SPHERE: +3.50
OD_SPHERE: UTP

## 2023-06-15 ASSESSMENT — KERATOMETRY
OD_AXISANGLE_DEGREES: 072
OS_K2POWER_DIOPTERS: 40.52
OS_AXISANGLE_DEGREES: 003
OD_K1POWER_DIOPTERS: 37.84
OD_K2POWER_DIOPTERS: 42.72
OS_K1POWER_DIOPTERS: 38.62

## 2023-06-15 ASSESSMENT — CORNEAL EDEMA CLINICAL DESCRIPTION: OD_CORNEALEDEMA: T OVER THE WOUND

## 2023-06-15 ASSESSMENT — CONFRONTATIONAL VISUAL FIELD TEST (CVF)
OS_FINDINGS: FULL
OD_FINDINGS: FULL

## 2023-06-15 ASSESSMENT — VISUAL ACUITY
OD_BCVA: 20/25-1
OS_BCVA: 20/200

## 2023-06-15 ASSESSMENT — AXIALLENGTH_DERIVED: OS_AL: 24.22

## 2023-06-15 NOTE — ASSESSMENT
[FreeTextEntry1] : ECG: SR at 61 BPM, 1st degree AVB, PRWP\par \par LAB DATA\par ----12/2/20---5/15/21---1/4/22--2/24/22--12/5/22--4/20/23\par Chol--155-----155-------147------NA--------165-----136\par LDL---79-------86--------80-------NA---------94------42\par HDL---47------46---------44-------NA---------41------68\par A1C--6.1----------------------------------------\par Creat.0.92---------------1.12-----1.29-----------------1.27\par \par 2/24/2022:\par Aldosterone, metanephrines, normetanephrine, plasma renin all within normal limits\par \par Lab data \par 9/22/2021:\par BUN 29/creatinine 1.33 increased from 0.94 since adding losartan 50 and HCTZ 25 mg\par \par Cardiac catheterization 6/1/2023 ([place])\par LMCA: Normal\par LAD: Proximal-80% with competitive flow\par Circumflex: No disease\par OM1: 70% RCA: Sequential 50% lesions\par Posterolateral branch 50%\par SVG OM1 patent\par LIMA to LAD patent\par (History of CABG x2 5/28/14)\par \par Portable Monitor Sleep Study: (3/31/22)\par MURALI (surrogate for AHI): 16.8\par OAI 2.5\par ANGELINA 0.8\par Lowest Desat 73\par \par Renal artery duplex 2/24/2022:\par There is no evidence of renal artery stenosis.\par \par Echocardiogram 9/16/2021:\par Normal LV size and function ejection fraction 60 to 65%\par Mild left atrial dilatation\par No significant valvular disease.\par \par Venous duplex lower extremity 9/23/2021:\par No evidence of DVT bilaterally\par \par 2/24/2022: Renal artery Doppler\par No evidence of renal artery stenosis \par \par extremity arterial duplex 9/16/2021:\par No evidence of any significant obstructive PAD.\par \par Carotid duplex 12/22/2022\par Left: Mild bulb: Moderate ICA: Peak 101 cm/s: 1 to 49%\par Right: Mild CCA bulb, ICA: Peak 81 cm/s: 1 to 41%\par \par Carotid study 10/30/20\par Mild plaque in the ICA and prox. ICA\par Moderate plaque in the left ECA\par Mild plaque in the distal right CCA\par Mild plaque in the right bulb\par Mild plaque in the right prox ICA\par \par Carotid study 9/20/18:\par Mild bilateral atherosclerotic plaquing. No hemodynamically significant stenosis. Left slightly worse than right.\par \par Pharmacologic stress 1/19/2023:\par Normal SPECT imaging.\par No evidence of ischemia.  Unchanged from prior study 2018\par \par Pharm. stress test 10/26/20\par Peal HR 68 bpm\par Peak /70\par Pcc. PACs, ECG negative for ischemia.\par Normal SPECT.\par EF 62%\par \par Echocardiogram 9/24/18:\par LVH with hyperdynamic left ventricular function. Ejection fraction greater than 70%.\par Mild subaortic outflow tract obstruction.\par Mild mitral and tricuspid insufficiency which appears less than on the prior echocardiogram.\par \par IMPRESSION:\par \par 1.  Status post possible non-ST elevation MI 5/30/2023, hypokalemia and BRUNILDA. Had cardiac catheterization that showed patent grafts. Had melena and shortness of breath post hospital discharge. Was found to be anemic and hospitalized at [facility] 6/9/2023. Received 1 unit PRBC. SOB now improved but still has some MACHADO and fatigue. \par \par 2.  Benign essential hypertension, had been difficult to control now seems to be well controlled. \par      Renal artery stenosis and other causes of secondary hypertension seems to be excluded \par \par 3. Hypokalemia possibly was due to hydrochlorothiazide and/or the vomiting. Sotalol was stopped due to QTc prolongation in the face of hypokalemia. Patient restarted Sotalol on his own. QTc today 443 msec. \par \par 4.stable mild bilateral atherosclerotic carotid disease unchanged from prior, no obstruction. \par    No evidence of any significant lower extremity PAD and the burning in his calf muscles has resolved.\par \par 5. Rare clinical recurrence of palps c/w his hx of paroxysmal atrial fibrillation on low dose sotalol. \par     Tolerating Xarelto and compliant with this. No clinical indication for Plavix at this time therefor was discontinued.\par \par 6.  Hyperlipidemia with marked improvement in LDL from 94-68 with change to rosuvastatin 20.\par \par 7.  Obstructive sleep apnea- patient cannot tolerate the mask and is electing to live without it understanding the consequent\par \par 8. BLE edema without shortness of breath. Is off HCTZ due to BRUNILDA and hypokalemia. \par           \par \par \par Plan:\par 1.  Continue current CV medications at current doses, including sotalol.\par      We will monitor QTc.\par \par 2.  Follow-up  metabolic panel to reassess renal function. If renal function stable, will consider resuming HCTZ. Will      need repeat BMP if diuretic is restarted. \par \par 3.  Choosing not to use CPAP for his obstructive sleep apnea\par \par 4.  Continue rosuvastatin 20 mg daily repeat blood work in 3 months\par \par 5. Continue Xarelto for thromboembolic ppx given PAF. \par \par 6. Advised to eat foods rich in potassium and iron. Follow-up with GI as scheduled. \par \par Clinical followup in 3 weeks. \par

## 2023-06-15 NOTE — REVIEW OF SYSTEMS
[Dyspnea on exertion] : dyspnea during exertion [Weight Gain (___ Lbs)] : [unfilled] ~Ulb weight gain [Feeling Fatigued] : feeling fatigued [Negative] : Heme/Lymph [Weight Loss (___ Lbs)] : no recent weight loss [Chest Discomfort] : no chest discomfort [Lower Ext Edema] : no extremity edema [Palpitations] : no palpitations [Orthopnea] : no orthopnea [PND] : no PND [FreeTextEntry2] : Other than as documented here and in the HPI, the thirteen point ROS is negative

## 2023-06-15 NOTE — REASON FOR VISIT
[FreeTextEntry1] : CLAUDIA STANFORD is a 78 year-old M presents here for cardiac evaluation following two recent hospitalizations. \par \par He had a recent hospitalization in [place] from 5/30/23 to 6/2/23. He presented with acute onset of nausea vertigo and vomiting. Initially hypertensive and hypokalemic (potassium-2.2) and creatinine increased to 2.4\par QTc 700 and sotalol was discontinued. He had troponin elevations and underwent cardiac catheterization that showed:\par LMCA: Normal\par LAD: Proximal-80% with competitive flow\par Circumflex: No disease\par OM1: 70% RCA: Sequential 50% lesions\par Posterolateral branch 50%\par SVG OM1 patent\par LIMA to LAD patent\par (History of CABG x2 5/28/14)\par \par Medication changes at discharge:\par At discharge, sotalol, hydrochlorothiazide discontinued\par Nifedipine changed from twice daily to once daily\par Carvedilol increased to 6.25 twice daily\par Clonidine had been previously changed from 0.2-0.1 3 times daily.\par Plavix was added to his medical regimen\par \par After discharge from hospitalization in [place], patient reports 1 episode of dark stools. On his last office visit, he complained of shortness of breath. He was found to be anemic with Hgb of 7.6 and advised 6/9/23  to go to [facility] for evaluation. During hospitalization, he was given 1 unit of PRBC. Hgb increased from 7.1 to 8.2. GI was consulted. He's had a recent EGD and colonoscopy that was normal and FOBT was negative, therefor,  colonoscopy was deferred. He had mild renal insufficiency that resolved prior to discharge.

## 2023-06-15 NOTE — HISTORY OF PRESENT ILLNESS
[FreeTextEntry1] : Patient reports improvement of his dyspnea on exertion but is still not back to his baseline. Is now able to walk down the [place]al[place] without shortness of breath which he was unable to do prior to blood transfusion. Reports MACHADO when going up the stairs. He denies any chest pain, palpitations, dizziness, lightheadedness, presyncope or syncope. Has mild edema. Denies orthopnea or PND. Admits to adding small amounts of salt to his food and eats out at times. \par \par \par \par \par \par \par \par

## 2023-06-15 NOTE — PHYSICAL EXAM
[FreeTextEntry1] :                    Well appearing and nourished with moderate alopecia, mild obesity  no obvious deformities or distress.\par \par Eyes: \par No conjunctival injection and no xanthelasmas.\par HEENT: \par Normocephalic.Normal oral mucosa. No pallor or cyanosis\par Neck: \par No jugular venous distension. with normal A and V wave forms. No palpable adenopathy.\par Cardiovascular: \par Normal rate and rhythm with normal S1, S2 and a grade 2/6 systolic murmur. Distal arterial pulses are normal.  Trace to 1+ bilateral pretibial peripheral edema.\par Pulmonary: \par Lungs are clear to auscultation and percussion. Normal respiratory pattern without any accessory muscle use\par Abdomen: \par Soft, non-tender ; no palpable organomegaly or masses.\par Extremities:\par No digital clubbing, cyanosis or ischemic changes.\par Skin: \par No skin lesions, rashes, ulcers or xanthomas.\par Psychiatric: \par Alert and oriented to person, place and time. Appropriate mood and affect.

## 2023-06-20 ENCOUNTER — NON-APPOINTMENT (OUTPATIENT)
Age: 78
End: 2023-06-20

## 2023-06-20 LAB
ANION GAP SERPL CALC-SCNC: 9 MMOL/L
BUN SERPL-MCNC: 19 MG/DL
CALCIUM SERPL-MCNC: 8.9 MG/DL
CHLORIDE SERPL-SCNC: 110 MMOL/L
CO2 SERPL-SCNC: 23 MMOL/L
CREAT SERPL-MCNC: 1.57 MG/DL
EGFR: 45 ML/MIN/1.73M2
GLUCOSE SERPL-MCNC: 92 MG/DL
MAGNESIUM SERPL-MCNC: 2 MG/DL
POTASSIUM SERPL-SCNC: 5.3 MMOL/L
SODIUM SERPL-SCNC: 142 MMOL/L

## 2023-07-03 LAB
ANION GAP SERPL CALC-SCNC: 13 MMOL/L
BUN SERPL-MCNC: 23 MG/DL
CALCIUM SERPL-MCNC: 9.4 MG/DL
CHLORIDE SERPL-SCNC: 105 MMOL/L
CO2 SERPL-SCNC: 22 MMOL/L
CREAT SERPL-MCNC: 1.53 MG/DL
EGFR: 47 ML/MIN/1.73M2
GLUCOSE SERPL-MCNC: 89 MG/DL
POTASSIUM SERPL-SCNC: 4.4 MMOL/L
SODIUM SERPL-SCNC: 140 MMOL/L

## 2023-07-06 ENCOUNTER — APPOINTMENT (OUTPATIENT)
Dept: CARDIOLOGY | Facility: CLINIC | Age: 78
End: 2023-07-06
Payer: MEDICARE

## 2023-07-06 VITALS
WEIGHT: 173 LBS | RESPIRATION RATE: 16 BRPM | DIASTOLIC BLOOD PRESSURE: 62 MMHG | HEART RATE: 60 BPM | OXYGEN SATURATION: 98 % | SYSTOLIC BLOOD PRESSURE: 110 MMHG | BODY MASS INDEX: 31.83 KG/M2 | HEIGHT: 62 IN

## 2023-07-06 PROCEDURE — 99214 OFFICE O/P EST MOD 30 MIN: CPT

## 2023-07-06 RX ORDER — OMEPRAZOLE 20 MG/1
20 CAPSULE, DELAYED RELEASE ORAL
Qty: 90 | Refills: 3 | Status: ACTIVE | COMMUNITY
Start: 2022-10-24 | End: 1900-01-01

## 2023-07-06 RX ORDER — HYDROCHLOROTHIAZIDE 25 MG/1
25 TABLET ORAL
Qty: 45 | Refills: 2 | Status: ACTIVE | COMMUNITY
Start: 2021-09-09 | End: 1900-01-01

## 2023-07-06 NOTE — PHYSICAL EXAM
[FreeTextEntry1] :                    Well appearing and nourished with moderate alopecia, mild obesity  no obvious deformities or distress.\par \par Eyes: \par No conjunctival injection and no xanthelasmas.\par HEENT: \par Normocephalic.Normal oral mucosa. No pallor or cyanosis\par Neck: \par No jugular venous distension. with normal A and V wave forms. No palpable adenopathy.\par Cardiovascular: \par Normal rate and rhythm with normal S1, S2 and a grade 2/6 systolic murmur. Distal arterial pulses are normal.  Trace  bilateral pretibial peripheral edema.\par Pulmonary: \par Lungs are clear to auscultation and percussion. Normal respiratory pattern without any accessory muscle use\par Abdomen: \par Soft, non-tender ; no palpable organomegaly or masses.\par Extremities:\par No digital clubbing, cyanosis or ischemic changes.\par Skin: \par No skin lesions, rashes, ulcers or xanthomas.\par Psychiatric: \par Alert and oriented to person, place and time. Appropriate mood and affect.

## 2023-07-06 NOTE — ASSESSMENT
[FreeTextEntry1] : \par LAB DATA\par ----12/2/20---5/15/21---1/4/22--2/24/22--12/5/22--4/20/23--6/19/23---7/1/23\par Chol--155-----155-------147------NA--------165-----136\par LDL---79-------86--------80-------NA---------94------42\par HDL---47------46---------44-------NA---------41------68\par A1C--6.1----------------------------------------\par Creat.0.92---------------1.12-----1.29-----------------1.27-------1.57-----1.53\par \par 2/24/2022:\par Aldosterone, metanephrines, normetanephrine, plasma renin all within normal limits\par \par Lab data \par 9/22/2021:\par BUN 29/creatinine 1.33 increased from 0.94 since adding losartan 50 and HCTZ 25 mg\par \par Cardiac catheterization 6/1/2023 ([place])\par LMCA: Normal\par LAD: Proximal-80% with competitive flow\par Circumflex: No disease\par OM1: 70% RCA: Sequential 50% lesions\par Posterolateral branch 50%\par SVG OM1 patent\par LIMA to LAD patent\par (History of CABG x2 5/28/14)\par \par Portable Monitor Sleep Study: (3/31/22)\par MURALI (surrogate for AHI): 16.8\par OAI 2.5\par ANGELINA 0.8\par Lowest Desat 73\par \par Renal artery duplex 2/24/2022:\par There is no evidence of renal artery stenosis.\par \par Echocardiogram 9/16/2021:\par Normal LV size and function ejection fraction 60 to 65%\par Mild left atrial dilatation\par No significant valvular disease.\par \par Venous duplex lower extremity 9/23/2021:\par No evidence of DVT bilaterally\par \par 2/24/2022: Renal artery Doppler\par No evidence of renal artery stenosis \par \par extremity arterial duplex 9/16/2021:\par No evidence of any significant obstructive PAD.\par \par Carotid duplex 12/22/2022\par Left: Mild bulb: Moderate ICA: Peak 101 cm/s: 1 to 49%\par Right: Mild CCA bulb, ICA: Peak 81 cm/s: 1 to 41%\par \par Carotid study 10/30/20\par Mild plaque in the ICA and prox. ICA\par Moderate plaque in the left ECA\par Mild plaque in the distal right CCA\par Mild plaque in the right bulb\par Mild plaque in the right prox ICA\par \par Carotid study 9/20/18:\par Mild bilateral atherosclerotic plaquing. No hemodynamically significant stenosis. Left slightly worse than right.\par \par Pharmacologic stress 1/19/2023:\par Normal SPECT imaging.\par No evidence of ischemia.  Unchanged from prior study 2018\par \par Pharm. stress test 10/26/20\par Peal HR 68 bpm\par Peak /70\par Pcc. PACs, ECG negative for ischemia.\par Normal SPECT.\par EF 62%\par \par Echocardiogram 9/24/18:\par LVH with hyperdynamic left ventricular function. Ejection fraction greater than 70%.\par Mild subaortic outflow tract obstruction.\par Mild mitral and tricuspid insufficiency which appears less than on the prior echocardiogram.\par \par IMPRESSION:\par \par 1.  Status post possible non-ST elevation MI 5/30/2023, hypokalemia and BRUNILDA. \par      Cardiac catheterization that showed patent grafts. \par      Had melena and shortness of breath post hospital discharge. Was found to be anemic 7.6 and hospitalized at          [facility] 6/9/2023. Received 1 unit PRBC. SOB now improved but still has some MACHADO and fatigue. \par      No GI work-up at this time\par \par 2.  Benign essential hypertension, had been difficult to control now seems to be well controlled. \par      Renal artery stenosis and other causes of secondary hypertension seems to be excluded \par \par 3. Hypokalemia possibly was due to hydrochlorothiazide and/or the vomiting. Sotalol was stopped due to QTc prolongation in the face of hypokalemia. Patient restarted Sotalol on his own. QTc 443 msec few weeks ago. \par \par 4.stable mild bilateral atherosclerotic carotid disease unchanged from prior, no obstruction. \par    No evidence of any significant lower extremity PAD and the burning in his calf muscles has resolved.\par \par 5. Rare clinical recurrence of palps c/w his hx of paroxysmal atrial fibrillation on low dose sotalol. \par     Tolerating Xarelto and compliant with this. No clinical indication for Plavix at this time therefor was discontinued.\par \par 6.  Hyperlipidemia with marked improvement in LDL from 94-68 with change to rosuvastatin 20.\par \par 7.  Obstructive sleep apnea- patient cannot tolerate the mask and is electing to live without it understanding the consequent\par \par 8.  Trace edema without shortness of breath.  On HCTZ  QOD due to BRUNILDA and hypokalemia. \par           \par \par \par Plan:\par 1.  Continue current CV medications at current doses, including sotalol.\par      We will monitor QTc.\par \par 2.  Maintain HCTZ QOD. Follow-up  metabolic panel to reassess renal function. Nephrology eval TBA\par \par 3.  Choosing not to use CPAP for his obstructive sleep apnea\par \par 4.  Continue rosuvastatin 20 mg daily repeat blood work in 3 months\par \par 5. Continue Xarelto for thromboembolic ppx given PAF. \par \par 6. Advised to eat foods rich in potassium and iron. Follow-up with GI as scheduled. \par \par Clinical followup in 6 weeks. \par

## 2023-07-06 NOTE — REVIEW OF SYSTEMS
[Weight Gain (___ Lbs)] : [unfilled] ~Ulb weight gain [Feeling Fatigued] : feeling fatigued [Dyspnea on exertion] : dyspnea during exertion [Negative] : Heme/Lymph [Weight Loss (___ Lbs)] : no recent weight loss [Chest Discomfort] : no chest discomfort [Lower Ext Edema] : no extremity edema [Palpitations] : no palpitations [Orthopnea] : no orthopnea [PND] : no PND [FreeTextEntry2] : Other than as documented here and in the HPI, the thirteen point ROS is negative

## 2023-07-06 NOTE — REASON FOR VISIT
[FreeTextEntry1] : CLAUDIA STANFORD is a 78 year-old M presents here for cardiac evaluation. \par \par Hospitalization in [place] - 5/30/23 to 6/2/23. He presented with acute onset of nausea vertigo and vomiting. Initially hypertensive and hypokalemic (potassium-2.2) and creatinine increased to 2.4\par QTc 700 and sotalol was discontinued. He had troponin elevations and underwent cardiac catheterization that showed:\par LMCA: Normal\par LAD: Proximal-80% with competitive flow\par Circumflex: No disease\par OM1: 70% RCA: Sequential 50% lesions\par Posterolateral branch 50%\par SVG OM1 patent\par LIMA to LAD patent\par (History of CABG x2 5/28/14)\par \par Medication changes at discharge:\par sotalol, hydrochlorothiazide discontinued\par Nifedipine changed from twice daily to once daily\par Carvedilol increased to 6.25 twice daily\par Clonidine had been previously changed from 0.2-   0.1     3 times daily.\par Plavix was added to his medical regimen\par \par After discharge from hospitalization in [place], patient reports 1 episode of dark stools. On his last office visit, he complained of shortness of breath. He was found to be anemic with Hgb of 7.6 and advised 6/9/23  to go to [facility] for evaluation. During hospitalization, he was given 1 unit of PRBC. Hgb increased from 7.1 to 8.2. GI was consulted. He's had a recent EGD and colonoscopy that was normal and FOBT was negative, therefor,  colonoscopy was deferred. He had mild renal insufficiency and has been referred to nephrology with an appointment in August

## 2023-07-27 ENCOUNTER — OFFICE (OUTPATIENT)
Dept: URBAN - METROPOLITAN AREA CLINIC 94 | Facility: CLINIC | Age: 78
Setting detail: OPHTHALMOLOGY
End: 2023-07-27
Payer: MEDICARE

## 2023-07-27 DIAGNOSIS — H35.3211: ICD-10-CM

## 2023-07-27 DIAGNOSIS — H35.3124: ICD-10-CM

## 2023-07-27 PROCEDURE — 92134 CPTRZ OPH DX IMG PST SGM RTA: CPT | Performed by: SPECIALIST

## 2023-07-27 PROCEDURE — 92012 INTRM OPH EXAM EST PATIENT: CPT | Performed by: SPECIALIST

## 2023-07-27 ASSESSMENT — REFRACTION_AUTOREFRACTION
OS_AXIS: 082
OS_SPHERE: +3.50
OD_CYLINDER: -1.75
OS_CYLINDER: -2.75
OD_SPHERE: UTP
OD_AXIS: 180

## 2023-07-27 ASSESSMENT — VISUAL ACUITY
OD_BCVA: 20/25-1
OS_BCVA: 20/50-1

## 2023-07-27 ASSESSMENT — KERATOMETRY
OS_K1POWER_DIOPTERS: 38.62
OD_K2POWER_DIOPTERS: 42.72
OD_K1POWER_DIOPTERS: 37.84
OS_K2POWER_DIOPTERS: 40.52
OD_AXISANGLE_DEGREES: 072
OS_AXISANGLE_DEGREES: 003

## 2023-07-27 ASSESSMENT — SPHEQUIV_DERIVED: OS_SPHEQUIV: 2.125

## 2023-07-27 ASSESSMENT — CONFRONTATIONAL VISUAL FIELD TEST (CVF)
OD_FINDINGS: FULL
OS_FINDINGS: FULL

## 2023-07-27 ASSESSMENT — TONOMETRY
OD_IOP_MMHG: 12
OS_IOP_MMHG: 19

## 2023-07-27 ASSESSMENT — CORNEAL EDEMA CLINICAL DESCRIPTION: OD_CORNEALEDEMA: T OVER THE WOUND

## 2023-07-27 ASSESSMENT — AXIALLENGTH_DERIVED: OS_AL: 24.22

## 2023-08-08 ENCOUNTER — NON-APPOINTMENT (OUTPATIENT)
Age: 78
End: 2023-08-08

## 2023-08-08 VITALS — WEIGHT: 173 LBS | HEIGHT: 62 IN | BODY MASS INDEX: 31.83 KG/M2

## 2023-08-08 DIAGNOSIS — Z87.891 PERSONAL HISTORY OF NICOTINE DEPENDENCE: ICD-10-CM

## 2023-08-08 NOTE — HISTORY OF PRESENT ILLNESS
[Former] : Former [TextBox_13] : Referred by Dr. Michael Brannon. Mr. STANFORD is a 78 year old male with a history of CAD s/p CABG, HTN, high cholesterol and prostate cancer. Reviewed and confirmed that the patient meets screening eligibility criteria: 78 years old Smoking Status: Former smoker Number of pack(s) per day: 1 Number of years smoked: 34 Number of pack years smokin Quit year:  No symptoms of lung cancer, including new cough, change in cough, hemoptysis, and unintentional weight loss. No personal history of lung cancer. No lung cancer in a first degree relative. No history of lung disease occupational exposures.   [YearQuit] : 2012 [PacksperYear] : 34

## 2023-08-10 ENCOUNTER — APPOINTMENT (OUTPATIENT)
Dept: CARDIOLOGY | Facility: CLINIC | Age: 78
End: 2023-08-10
Payer: MEDICARE

## 2023-08-10 VITALS
HEIGHT: 62 IN | HEART RATE: 56 BPM | SYSTOLIC BLOOD PRESSURE: 100 MMHG | WEIGHT: 171 LBS | BODY MASS INDEX: 31.47 KG/M2 | OXYGEN SATURATION: 97 % | RESPIRATION RATE: 16 BRPM | DIASTOLIC BLOOD PRESSURE: 70 MMHG

## 2023-08-10 LAB
ALBUMIN SERPL ELPH-MCNC: 4.4 G/DL
ALP BLD-CCNC: 112 U/L
ALT SERPL-CCNC: 16 U/L
ANION GAP SERPL CALC-SCNC: 12 MMOL/L
AST SERPL-CCNC: 20 U/L
BILIRUB SERPL-MCNC: 0.2 MG/DL
BUN SERPL-MCNC: 25 MG/DL
CALCIUM SERPL-MCNC: 9.5 MG/DL
CHLORIDE SERPL-SCNC: 108 MMOL/L
CHOLEST SERPL-MCNC: 125 MG/DL
CO2 SERPL-SCNC: 22 MMOL/L
CREAT SERPL-MCNC: 1.4 MG/DL
EGFR: 52 ML/MIN/1.73M2
GLUCOSE SERPL-MCNC: 106 MG/DL
HDLC SERPL-MCNC: 45 MG/DL
LDLC SERPL CALC-MCNC: 55 MG/DL
NONHDLC SERPL-MCNC: 81 MG/DL
POTASSIUM SERPL-SCNC: 4.8 MMOL/L
PROT SERPL-MCNC: 6.5 G/DL
SODIUM SERPL-SCNC: 142 MMOL/L
TRIGL SERPL-MCNC: 149 MG/DL

## 2023-08-10 PROCEDURE — 93000 ELECTROCARDIOGRAM COMPLETE: CPT

## 2023-08-10 PROCEDURE — 99214 OFFICE O/P EST MOD 30 MIN: CPT | Mod: 25

## 2023-08-10 NOTE — HISTORY OF PRESENT ILLNESS
[FreeTextEntry1] : Patient reports continued improvement of his dyspnea on exertion but is still not back to his baseline. Is now able to walk down the [place]alado without shortness of breath which he was unable to do prior to blood transfusion. Reports MACHADO when going up the stairs. He denies any chest pain, palpitations, dizziness, lightheadedness, presyncope or syncope. Has mild edema. Denies orthopnea or PND. Admits to adding small amounts of salt to his food and eats out at times.

## 2023-08-10 NOTE — REASON FOR VISIT
[FreeTextEntry1] : CLAUDIA STANFORD is a 78 year-old M presents here for cardiac evaluation .   His cardiac history includes:  1. CABG x 3 in 2014.  2. Hypertension.  3. Hyperlipidemia.  Further medical history includes:  1. GERD.  2. Prostate cancer.  3. Ulcerative colitis.  4. Bariatric surgery.  5. PMR.  6. CKD  Recently, management of his blood pressure has become an issue once again:  5/30/23 to 6/2/23. Hospitalization in [place] with acute onset of nausea vertigo and vomiting. Initially hypertensive and hypokalemic (potassium-2.2) and creatinine increased to 2.4 QTc 700 and sotalol was discontinued. He had troponin elevations.   Cardiac catheterization: LMCA: Normal LAD: Proximal-80% with competitive flow Circumflex: No disease OM1: 70% RCA: Sequential 50% lesions Posterolateral branch 50% SVG OM1 patent LIMA to LAD patent (History of CABG x2 5/28/14)  Medication changes at discharge: sotalol, hydrochlorothiazide discontinued Nifedipine changed from twice daily to once daily Carvedilol increased to 6.25 twice daily Clonidine had been previously changed from 0.2-   0.1     3 times daily. Plavix was added to his medical regimen  After discharge from hospitalization in [place], patient reports 1 episode of dark stools. On his last office visit, he complained of shortness of breath. He was found to be anemic with Hgb of 7.6 and advised 6/9/23  to go to [facility] for evaluation. During hospitalization, he was given 1 unit of PRBC. Hgb increased from 7.1 to 8.2. GI was consulted. He's had a recent EGD and colonoscopy that was normal and FOBT was negative, therefor,  colonoscopy was deferred. He has since undergone EGD and small bowel pill enteroscopy which were reportedly negative. He has commence with iron replacement therapy and generally speaking feeling a little bit better. He had mild renal insufficiency and has been referred to nephrology with an appointment in August

## 2023-08-10 NOTE — ASSESSMENT
[FreeTextEntry1] : ECG:   Sinus bradycardia at 56 bpm   First-degree AV block, with leftward axis deviation and nonspecific T wave abnormalities.  LAB DATA ----12/2/20---5/15/21---1/4/22--2/24/22--12/5/22--4/20/23--6/19/23---7/1/23 Chol--155-----155-------147------NA--------165-----136 LDL---79-------86--------80-------NA---------94------42 HDL---47------46---------44-------NA---------41------68 A1C--6.1---------------------------------------- Creat.0.92---------------1.12-----1.29-----------------1.27-------1.57-----1.53  2/24/2022: Aldosterone, metanephrines, normetanephrine, plasma renin all within normal limits  Lab data  9/22/2021: BUN 29/creatinine 1.33 increased from 0.94 since adding losartan 50 and HCTZ 25 mg  Cardiac catheterization 6/1/2023 ([place]) LMCA: Normal LAD: Proximal-80% with competitive flow Circumflex: No disease OM1: 70% RCA: Sequential 50% lesions Posterolateral branch 50% SVG OM1 patent LIMA to LAD patent (History of CABG x2 5/28/14)  Portable Monitor Sleep Study: (3/31/22) MURALI (surrogate for AHI): 16.8 OAI 2.5 ANGELINA 0.8 Lowest Desat 73  Renal artery duplex 2/24/2022: There is no evidence of renal artery stenosis.  Echocardiogram 9/16/2021: Normal LV size and function ejection fraction 60 to 65% Mild left atrial dilatation No significant valvular disease.  Venous duplex lower extremity 9/23/2021: No evidence of DVT bilaterally  2/24/2022: Renal artery Doppler No evidence of renal artery stenosis   extremity arterial duplex 9/16/2021: No evidence of any significant obstructive PAD.  Carotid duplex 12/22/2022 Left: Mild bulb: Moderate ICA: Peak 101 cm/s: 1 to 49% Right: Mild CCA bulb, ICA: Peak 81 cm/s: 1 to 41%  Carotid study 10/30/20 Mild plaque in the ICA and prox. ICA Moderate plaque in the left ECA Mild plaque in the distal right CCA Mild plaque in the right bulb Mild plaque in the right prox ICA  Carotid study 9/20/18: Mild bilateral atherosclerotic plaquing. No hemodynamically significant stenosis. Left slightly worse than right.  Pharmacologic stress 1/19/2023: Normal SPECT imaging. No evidence of ischemia.  Unchanged from prior study 2018  Pharm. stress test 10/26/20 Peal HR 68 bpm Peak /70 Pcc. PACs, ECG negative for ischemia. Normal SPECT. EF 62%  Echocardiogram 9/24/18: LVH with hyperdynamic left ventricular function. Ejection fraction greater than 70%. Mild subaortic outflow tract obstruction. Mild mitral and tricuspid insufficiency which appears less than on the prior echocardiogram.  IMPRESSION:  1.  Status post possible non-ST elevation MI 5/30/2023, hypokalemia and BRUNILDA.       Cardiac catheterization that showed patent grafts.       Had melena and shortness of breath post hospital discharge. Was found to be anemic 7.6 and hospitalized at                [facility] 6/9/2023. Received 1 unit PRBC. SOB now improved but still has some MACHADO and fatigue.       GI work-up -  Negative EGD and Small bowel Capsule study  2.  Benign essential hypertension, had been difficult to control now seems to be well controlled.       Renal artery stenosis and other causes of secondary hypertension seems to be excluded   3. Hypokalemia possibly was due to hydrochlorothiazide and/or the vomiting. Sotalol was stopped due to QTc      prolongation in the face of hypokalemia. Patient restarted Sotalol on his own. QTc 443 msec few weeks ago      now 422 ms.   4.stable mild bilateral atherosclerotic carotid disease unchanged from prior, no obstruction.     No evidence of any significant lower extremity PAD and the burning in his calf muscles has resolved.  5. Rare clinical recurrence of palps c/w his hx of paroxysmal atrial fibrillation on low dose sotalol.      Tolerating Xarelto and compliant with this. No clinical indication for Plavix at this time therefor was discontinued.  6.  Hyperlipidemia with marked improvement in LDL from 94-68 with change to rosuvastatin 20.  7.  Obstructive sleep apnea- patient cannot tolerate the mask and is electing to live without it understanding the consequences.  8.  Trace edema without shortness of breath.  On HCTZ  QOD due to BRUNILDA and hypokalemia.             Plan: 1.  Continue current CV medications at current doses, including sotalol.      We will monitor QTc.   2.  Maintain HCTZ QOD. Follow-up  metabolic panel to reassess renal function. Nephrology eval TBA  3.  Choosing not to use CPAP for his obstructive sleep apnea  4.  Continue rosuvastatin 20 mg daily repeat blood work in 3 months  5. Continue Xarelto for thromboembolic ppx given PAF.   6. Advised to eat foods rich in potassium and iron. Follow-up with GI as scheduled.   Clinical follow up in 6 weeks.

## 2023-08-17 ENCOUNTER — APPOINTMENT (OUTPATIENT)
Dept: CT IMAGING | Facility: CLINIC | Age: 78
End: 2023-08-17
Payer: MEDICARE

## 2023-08-17 ENCOUNTER — OUTPATIENT (OUTPATIENT)
Dept: OUTPATIENT SERVICES | Facility: HOSPITAL | Age: 78
LOS: 1 days | End: 2023-08-17

## 2023-08-17 DIAGNOSIS — Z87.891 PERSONAL HISTORY OF NICOTINE DEPENDENCE: ICD-10-CM

## 2023-08-17 PROCEDURE — 71271 CT THORAX LUNG CANCER SCR C-: CPT | Mod: 26

## 2023-09-23 ENCOUNTER — OFFICE (OUTPATIENT)
Dept: URBAN - METROPOLITAN AREA CLINIC 94 | Facility: CLINIC | Age: 78
Setting detail: OPHTHALMOLOGY
End: 2023-09-23
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
## 2023-09-23 PROCEDURE — NO SHOW FE NO SHOW FEE: Performed by: SPECIALIST

## 2023-10-26 ENCOUNTER — OFFICE (OUTPATIENT)
Dept: URBAN - METROPOLITAN AREA CLINIC 94 | Facility: CLINIC | Age: 78
Setting detail: OPHTHALMOLOGY
End: 2023-10-26
Payer: MEDICARE

## 2023-10-26 DIAGNOSIS — H35.3211: ICD-10-CM

## 2023-10-26 PROCEDURE — 92134 CPTRZ OPH DX IMG PST SGM RTA: CPT | Performed by: SPECIALIST

## 2023-10-26 PROCEDURE — 92012 INTRM OPH EXAM EST PATIENT: CPT | Performed by: SPECIALIST

## 2023-10-26 ASSESSMENT — REFRACTION_AUTOREFRACTION
OS_AXIS: 082
OD_SPHERE: UTP
OS_SPHERE: +3.50
OD_AXIS: 180
OD_CYLINDER: -1.75
OS_CYLINDER: -2.75

## 2023-10-26 ASSESSMENT — KERATOMETRY
OD_K2POWER_DIOPTERS: 42.72
OS_AXISANGLE_DEGREES: 003
OS_K1POWER_DIOPTERS: 38.62
OS_K2POWER_DIOPTERS: 40.52
OD_K1POWER_DIOPTERS: 37.84
OD_AXISANGLE_DEGREES: 072

## 2023-10-26 ASSESSMENT — TONOMETRY
OD_IOP_MMHG: 10
OS_IOP_MMHG: 18

## 2023-10-26 ASSESSMENT — CORNEAL EDEMA CLINICAL DESCRIPTION: OD_CORNEALEDEMA: T OVER THE WOUND

## 2023-10-26 ASSESSMENT — VISUAL ACUITY
OD_BCVA: 20/30+
OS_BCVA: 20/50+

## 2023-10-26 ASSESSMENT — AXIALLENGTH_DERIVED: OS_AL: 24.22

## 2023-10-26 ASSESSMENT — SPHEQUIV_DERIVED: OS_SPHEQUIV: 2.125

## 2023-11-02 ENCOUNTER — LABORATORY RESULT (OUTPATIENT)
Age: 78
End: 2023-11-02

## 2023-12-21 ENCOUNTER — APPOINTMENT (OUTPATIENT)
Dept: CARDIOLOGY | Facility: CLINIC | Age: 78
End: 2023-12-21
Payer: MEDICARE

## 2023-12-21 VITALS
HEIGHT: 62 IN | WEIGHT: 177 LBS | DIASTOLIC BLOOD PRESSURE: 60 MMHG | BODY MASS INDEX: 32.57 KG/M2 | OXYGEN SATURATION: 97 % | SYSTOLIC BLOOD PRESSURE: 100 MMHG | RESPIRATION RATE: 16 BRPM | HEART RATE: 50 BPM

## 2023-12-21 PROCEDURE — 99214 OFFICE O/P EST MOD 30 MIN: CPT | Mod: 25

## 2023-12-21 PROCEDURE — 93000 ELECTROCARDIOGRAM COMPLETE: CPT

## 2023-12-21 RX ORDER — CLONIDINE HYDROCHLORIDE 0.1 MG/1
0.1 TABLET ORAL 3 TIMES DAILY
Qty: 270 | Refills: 3 | Status: ACTIVE | COMMUNITY
Start: 1900-01-01 | End: 1900-01-01

## 2023-12-21 RX ORDER — SOTALOL HYDROCHLORIDE 80 MG/1
80 TABLET ORAL
Qty: 90 | Refills: 3 | Status: ACTIVE | COMMUNITY
Start: 1900-01-01 | End: 1900-01-01

## 2023-12-21 RX ORDER — ESCITALOPRAM OXALATE 10 MG/1
10 TABLET ORAL DAILY
Qty: 90 | Refills: 3 | Status: ACTIVE | COMMUNITY
Start: 1900-01-01 | End: 1900-01-01

## 2023-12-21 RX ORDER — CARVEDILOL 3.12 MG/1
3.12 TABLET, FILM COATED ORAL
Qty: 180 | Refills: 3 | Status: ACTIVE | COMMUNITY
Start: 1900-01-01 | End: 1900-01-01

## 2023-12-21 RX ORDER — NIFEDIPINE 60 MG/1
60 TABLET, EXTENDED RELEASE ORAL TWICE DAILY
Qty: 180 | Refills: 3 | Status: ACTIVE | COMMUNITY
Start: 2022-02-14 | End: 1900-01-01

## 2023-12-21 RX ORDER — RIVAROXABAN 20 MG/1
20 TABLET, FILM COATED ORAL
Qty: 90 | Refills: 3 | Status: ACTIVE | COMMUNITY
Start: 2018-08-07 | End: 1900-01-01

## 2023-12-21 NOTE — HISTORY OF PRESENT ILLNESS
[FreeTextEntry1] : Patient reports continued improvement of his dyspnea on exertion and now feels that he is near his baseline. He denies any chest pain, palpitations, dizziness, lightheadedness, presyncope or syncope. Has mild edema. Denies orthopnea or PND. Admits to adding small amounts of salt to his food and eats out at times.

## 2023-12-21 NOTE — ASSESSMENT
[FreeTextEntry1] : ECG: Sinus bradycardia at 56 bpm First-degree AV block, with leftward axis deviation and nonspecific T wave abnormalities.  LAB DATA ----12/2/20---5/15/21---1/4/22--2/24/22--12/5/22--4/20/23--6/19/23---7/1/23--11/2/23 Chol--155-----155-------147------NA--------165-----136----------------------------------137 HDL---47------46---------44-------NA---------41------68-------------------------------------44 LDL---79-------86--------80-------NA---------94------42-------------------------------------59 A1C--6.1-------------------------------------------------------------------------------------------6.8- Creat.0.92---------------1.12-----1.29-----------------1.27-------1.57-----1.53-----------1.28  2/24/2022: Aldosterone, metanephrines, normetanephrine, plasma renin all within normal limits  Lab data 9/22/2021: BUN 29/creatinine 1.33 increased from 0.94 since adding losartan 50 and HCTZ 25 mg  Cardiac catheterization 6/1/2023 ([place]) LMCA: Normal LAD: Proximal-80% with competitive flow Circumflex: No disease OM1: 70% RCA: Sequential 50% lesions Posterolateral branch 50% SVG OM1 patent LIMA to LAD patent (History of CABG x2    5/28/14)  Portable Monitor Sleep Study: (3/31/22) MURALI (surrogate for AHI): 16.8 OAI 2.5 ANGELINA 0.8 Lowest Desat 73  Renal artery duplex 2/24/2022: There is no evidence of renal artery stenosis.  Echocardiogram 9/16/2021: Normal LV size and function ejection fraction 60 to 65% Mild left atrial dilatation No significant valvular disease.  Venous duplex lower extremity 9/23/2021: No evidence of DVT bilaterally  2/24/2022: Renal artery Doppler No evidence of renal artery stenosis  extremity arterial duplex 9/16/2021: No evidence of any significant obstructive PAD.  Carotid duplex 12/22/2022 Left: Mild bulb: Moderate ICA: Peak 101 cm/s: 1 to 49% Right: Mild CCA bulb, ICA: Peak 81 cm/s: 1 to 41%  Carotid study 10/30/20 Mild plaque in the ICA and prox. ICA Moderate plaque in the left ECA Mild plaque in the distal right CCA Mild plaque in the right bulb Mild plaque in the right prox ICA  Carotid study 9/20/18: Mild bilateral atherosclerotic plaquing. No hemodynamically significant stenosis. Left slightly worse than right.  Pharmacologic stress 1/19/2023: Normal SPECT imaging. No evidence of ischemia. Unchanged from prior study 2018  Pharm. stress test 10/26/20 Peal HR 68 bpm Peak /70 Pcc. PACs, ECG negative for ischemia. Normal SPECT. EF 62%  Echocardiogram 9/24/18: LVH with hyperdynamic left ventricular function. Ejection fraction greater than 70%. Mild subaortic outflow tract obstruction. Mild mitral and tricuspid insufficiency which appears less than on the prior echocardiogram.  IMPRESSION:  1. Status post possible non-ST elevation MI 5/30/2023, hypokalemia and BRUNILDA.  Cardiac catheterization that showed patent grafts.  Had melena and shortness of breath post hospital discharge. Was found to be anemic 7.6 and hospitalized at  [facility] 6/9/2023. Received 1 unit PRBC. SOB now improved most recent hemoglobin 13.  GI work-up - Negative EGD and Small bowel Capsule study  2. Benign essential hypertension, had been difficult to control now seems to be well controlled.  Renal artery stenosis and other causes of secondary hypertension seems to be excluded  3. Hypokalemia possibly was due to hydrochlorothiazide and/or the vomiting. Sotalol was stopped due to QTc  prolongation in the face of hypokalemia. Patient restarted Sotalol on his own. QTc 443 msec few weeks ago  now 408 ms.  4.stable mild bilateral atherosclerotic carotid disease unchanged from prior, no obstruction.  No evidence of any significant lower extremity PAD and the burning in his calf muscles has resolved.  5. Rare clinical recurrence of palps c/w his hx of paroxysmal atrial fibrillation on low dose sotalol.  Tolerating Xarelto and compliant with this. No clinical indication for Plavix at this time therefor was discontinued.  6. Hyperlipidemia with marked improvement in LDL from 94-68 - 59 with change to rosuvastatin 20.  7. Obstructive sleep apnea- patient cannot tolerate the mask and is electing to live without it understanding the consequences.  8.  A1c consistent with diabetes mellitus.  Plan: 1. Continue current CV medications at current doses, including sotalol.  We will monitor QTc.  2. Maintain HCTZ QOD. Follow-up metabolic panel to reassess renal function. Nephrology eval TBA  3. Choosing not to use CPAP for his obstructive sleep apnea  4. Continue rosuvastatin 20 mg daily repeat blood work in 3 months  5. Continue Xarelto for thromboembolic ppx given PAF.  6. Advised to eat foods rich in potassium and iron. Follow-up with GI as scheduled.  7.  Musk regroup with Dr. Nuvia Lyon re: the A1c 6.8  Clinical follow up in 4 months.

## 2023-12-21 NOTE — REASON FOR VISIT
[FreeTextEntry1] : CLAUDIA STANFORD is a 78 year-old M presents here for cardiac evaluation .   His cardiac history includes:  1. CABG x 3 in 2014.  2. Hypertension.  3. Hyperlipidemia.  Further medical history includes:  1. GERD.  2. Prostate cancer.  3. Ulcerative colitis.  4. Bariatric surgery.  5. PMR.  6. CKD  Acceleration of his hypertension was a significant issue through much of this year but now has normalized.  5/30/23 to 6/2/23. Hospitalization in [place] with acute onset of nausea vertigo and vomiting. Initially hypertensive and hypokalemic (potassium-2.2) and creatinine increased to 2.4 QTc 700 and sotalol was discontinued. He had troponin elevations.   Cardiac catheterization: LMCA: Normal LAD: Proximal-80% with competitive flow Circumflex: No disease OM1: 70% RCA: Sequential 50% lesions Posterolateral branch 50% SVG OM1 patent LIMA to LAD patent (History of CABG x2 5/28/14)  After discharge from hospitalization in [place], patient reports 1 episode of dark stools. He had shortness of breath with Hgb of 7.6 and advised 6/9/23  to go to [facility]..  Transfused 1 unit of PRBC. Hgb increased from 7.1 to 8.2. GI was consulted. He's had a recent EGD and colonoscopy that was normal and FOBT was negative, therefor,  colonoscopy was deferred. He has since undergone EGD and small bowel pill enteroscopy which were reportedly negative. He has commence with iron replacement therapy and generally speaking feeling a little bit better. He had mild renal insufficiency and has followed up there.

## 2024-02-02 NOTE — ED ADULT NURSE NOTE - SUICIDE SCREENING QUESTION 1
Rx Refill Note  Requested Prescriptions     Pending Prescriptions Disp Refills    amLODIPine-valsartan (EXFORGE)  MG per tablet [Pharmacy Med Name: AMLODIPINE-VALSARTAN 10-160MG TABS] 30 tablet 5     Sig: TAKE 1 TABLET BY MOUTH DAILY      Last office visit with prescribing clinician: 9/8/2023   Last telemedicine visit with prescribing clinician: Visit date not found   Next office visit with prescribing clinician: Visit date not found                         Would you like a call back once the refill request has been completed: [] Yes [] No    If the office needs to give you a call back, can they leave a voicemail: [] Yes [] No    Irina Toney MA  02/02/24, 14:28 EST  
No

## 2024-04-18 ENCOUNTER — APPOINTMENT (OUTPATIENT)
Dept: CARDIOLOGY | Facility: CLINIC | Age: 79
End: 2024-04-18
Payer: COMMERCIAL

## 2024-04-18 VITALS
HEIGHT: 62 IN | DIASTOLIC BLOOD PRESSURE: 70 MMHG | SYSTOLIC BLOOD PRESSURE: 110 MMHG | HEART RATE: 46 BPM | WEIGHT: 175 LBS | RESPIRATION RATE: 16 BRPM | OXYGEN SATURATION: 98 % | BODY MASS INDEX: 32.2 KG/M2

## 2024-04-18 DIAGNOSIS — G47.33 OBSTRUCTIVE SLEEP APNEA (ADULT) (PEDIATRIC): ICD-10-CM

## 2024-04-18 DIAGNOSIS — K51.90 ULCERATIVE COLITIS, UNSPECIFIED, W/OUT COMPLICATIONS: ICD-10-CM

## 2024-04-18 DIAGNOSIS — I48.0 PAROXYSMAL ATRIAL FIBRILLATION: ICD-10-CM

## 2024-04-18 DIAGNOSIS — E11.9 TYPE 2 DIABETES MELLITUS W/OUT COMPLICATIONS: ICD-10-CM

## 2024-04-18 DIAGNOSIS — E78.5 HYPERLIPIDEMIA, UNSPECIFIED: ICD-10-CM

## 2024-04-18 DIAGNOSIS — N28.9 DISORDER OF KIDNEY AND URETER, UNSPECIFIED: ICD-10-CM

## 2024-04-18 DIAGNOSIS — R60.0 LOCALIZED EDEMA: ICD-10-CM

## 2024-04-18 DIAGNOSIS — I10 ESSENTIAL (PRIMARY) HYPERTENSION: ICD-10-CM

## 2024-04-18 DIAGNOSIS — I25.10 ATHEROSCLEROTIC HEART DISEASE OF NATIVE CORONARY ARTERY W/OUT ANGINA PECTORIS: ICD-10-CM

## 2024-04-18 DIAGNOSIS — R06.02 SHORTNESS OF BREATH: ICD-10-CM

## 2024-04-18 PROCEDURE — 99214 OFFICE O/P EST MOD 30 MIN: CPT

## 2024-04-18 PROCEDURE — 93000 ELECTROCARDIOGRAM COMPLETE: CPT

## 2024-04-18 PROCEDURE — G2211 COMPLEX E/M VISIT ADD ON: CPT

## 2024-04-18 RX ORDER — SUCRALFATE 1 G/1
1 TABLET ORAL
Qty: 58 | Refills: 0 | Status: DISCONTINUED | COMMUNITY
Start: 2023-05-26 | End: 2024-04-18

## 2024-04-18 RX ORDER — ROSUVASTATIN CALCIUM 20 MG/1
20 TABLET, FILM COATED ORAL
Qty: 90 | Refills: 3 | Status: ACTIVE | COMMUNITY
Start: 2023-01-26 | End: 1900-01-01

## 2024-04-18 RX ORDER — MESALAMINE 1.2 G/1
1.2 TABLET, DELAYED RELEASE ORAL DAILY
Refills: 0 | Status: ACTIVE | COMMUNITY

## 2024-04-18 RX ORDER — METFORMIN HYDROCHLORIDE 500 MG/1
500 TABLET, FILM COATED, EXTENDED RELEASE ORAL
Refills: 0 | Status: ACTIVE | COMMUNITY

## 2024-04-18 NOTE — ASSESSMENT
[FreeTextEntry1] : ECG: Sinus bradycardia at 46 bpm First-degree AV block, with leftward axis deviation and nonspecific T wave abnormalities.  LAB DATA ----12/2/20---5/15/21---1/4/22--2/24/22--12/5/22--4/20/23--6/19/23---7/1/23--11/2/23--4/4/24 Chol--155-----155-------147------NA--------165-----136----------------------------------137------131 HDL---47------46---------44-------NA---------41------68-------------------------------------44-------41 LDL---79-------86--------80-------NA---------94------42-------------------------------------59--------42 A1C--6.1-------------------------------------------------------------------------------------------6.8-------6.7 Creat.0.92---------------1.12-----1.29-----------------1.27-------1.57-----1.53----------1.28------1.45 2/24/2022: Aldosterone, metanephrines, normetanephrine, plasma renin all within normal limits  Lab data 9/22/2021: BUN 29/creatinine 1.33 increased from 0.94 since adding losartan 50 and HCTZ 25 mg  Cardiac catheterization 6/1/2023 ([place]) LMCA: Normal LAD: Proximal-80% with competitive flow Circumflex: No disease OM1: 70% RCA: Sequential 50% lesions Posterolateral branch 50% SVG OM1 patent LIMA to LAD patent (History of CABG x2    5/28/14)  Portable Monitor Sleep Study: (3/31/22) MURALI (surrogate for AHI): 16.8 OAI 2.5 ANGELINA 0.8 Lowest Desat 73  Renal artery duplex 2/24/2022: There is no evidence of renal artery stenosis.  Echocardiogram 9/16/2021: Normal LV size and function ejection fraction 60 to 65% Mild left atrial dilatation No significant valvular disease.  Venous duplex lower extremity 9/23/2021: No evidence of DVT bilaterally  2/24/2022: Renal artery Doppler No evidence of renal artery stenosis  extremity arterial duplex 9/16/2021: No evidence of any significant obstructive PAD.  Carotid duplex 12/22/2022 Left: Mild bulb: Moderate ICA: Peak 101 cm/s: 1 to 49% Right: Mild CCA bulb, ICA: Peak 81 cm/s: 1 to 41%  Carotid study 10/30/20 Mild plaque in the ICA and prox. ICA Moderate plaque in the left ECA Mild plaque in the distal right CCA Mild plaque in the right bulb Mild plaque in the right prox ICA  Carotid study 9/20/18: Mild bilateral atherosclerotic plaquing. No hemodynamically significant stenosis. Left slightly worse than right.  Pharmacologic stress 1/19/2023: Normal SPECT imaging. No evidence of ischemia. Unchanged from prior study 2018  Pharm. stress test 10/26/20 Peal HR 68 bpm Peak /70 Pcc. PACs, ECG negative for ischemia. Normal SPECT. EF 62%  Echocardiogram 9/24/18: LVH with hyperdynamic left ventricular function. Ejection fraction greater than 70%. Mild subaortic outflow tract obstruction. Mild mitral and tricuspid insufficiency which appears less than on the prior echocardiogram.  IMPRESSION:  1. Status post possible non-ST elevation MI 5/30/2023, hypokalemia and BRUNILDA.  Cardiac catheterization --  patent grafts.  Had melena and shortness of breath post hospital discharge>> Hgb = 7.6 and hospitalized at  [facility] 6/9/2023. Received 1 unit PRBC. SOB now improved most recent hemoglobin 13. GI work-up - Negative EGD and Small bowel Capsule study.  2. Benign essential hypertension, had been difficult to control now seems to be well controlled. Renal artery stenosis and other causes of secondary hypertension seems to be excluded.  3. Hypokalemia possibly was due to hydrochlorothiazide and/or the vomiting. Sotalol was stopped due to QTc  prolongation in the face of hypokalemia. Patient restarted Sotalol on his own. QTc 443 msec few weeks ago  now 407 ms.  4.stable mild bilateral atherosclerotic carotid disease 2022 -- no obstruction. No evidence of any significant lower extremity PAD and the burning in his calf muscles has resolved.  5. Rare clinical recurrence of palps c/w his hx of paroxysmal atrial fibrillation on low dose sotalol. Tolerating Xarelto and compliant with this. No clinical indication for Plavix at this time therefor was discontinued.  6. Hyperlipidemia with marked improvement in LDL from 94-68 - 59--43 with change to rosuvastatin 20.  7. Obstructive sleep apnea- patient cannot tolerate the mask and is electing to live without it understanding the consequences.  8.  A1c consistent with diabetes mellitus.  9.  Progression of bradycardia of which the patient states he is asymptomatic.  Plan: 1.  Regular exercise stress test to assess for chronotropic competence.  2. Maintain HCTZ QOD. Follow-up metabolic panel to reassess renal function. Nephrology lucian NUGENT.  3. Choosing not to use CPAP for his obstructive sleep apnea.  4. Continue rosuvastatin 20 mg daily repeat blood work in 3 months.  5. Continue Xarelto for thromboembolic ppx given PAF.  6. Advised to eat foods rich in potassium and iron.   7.  Must regroup with Dr. Nuvia Lyon re: the A1c 6.8.  Clinical follow up in 4 months.

## 2024-04-18 NOTE — REASON FOR VISIT
[FreeTextEntry1] : CLAUDIA STANFORD is a 79 year-old M presents here for cardiac evaluation .   His cardiac history includes:  1. CABG x 3 in 2014.  2. Hypertension.  3. Hyperlipidemia.  Further medical history includes:  1. GERD.  2. Prostate cancer.  3. Ulcerative colitis.  4. Bariatric surgery.  5. PMR.  6. CKD  Acceleration of his hypertension was a significant issue through much of this year but now has normalized.  5/30/23 to 6/2/23. Hospitalization in [place] with acute onset of nausea vertigo and vomiting. Initially hypertensive and hypokalemic (potassium-2.2) and creatinine increased to 2.4 QTc 700 and sotalol was discontinued. He had troponin elevations.   Cardiac catheterization: LMCA: Normal LAD: Proximal-80% with competitive flow Circumflex: No disease OM1: 70% RCA: Sequential 50% lesions Posterolateral branch 50% SVG OM1 patent LIMA to LAD patent (History of CABG x2 5/28/14)  After discharge from hospitalization in [place], patient reports 1 episode of dark stools. He had shortness of breath with Hgb of 7.6 and advised 6/9/23  to go to [facility]..  Transfused 1 unit of PRBC. Hgb increased from 7.1 to 8.2. GI was consulted. He's had a recent EGD and colonoscopy that was normal and FOBT was negative, therefor,  colonoscopy was deferred. He has since undergone EGD and small bowel pill enteroscopy which were reportedly negative. He has commence with iron replacement therapy and generally speaking feeling a little bit better. He had mild renal insufficiency and has followed up there.

## 2024-04-18 NOTE — REVIEW OF SYSTEMS
[Feeling Fatigued] : feeling fatigued [Dyspnea on exertion] : dyspnea during exertion [Negative] : Heme/Lymph [Weight Loss (___ Lbs)] : [unfilled] ~Ulb weight loss [Weight Gain (___ Lbs)] : no recent weight gain [Chest Discomfort] : no chest discomfort [Lower Ext Edema] : no extremity edema [Palpitations] : no palpitations [Orthopnea] : no orthopnea [PND] : no PND [FreeTextEntry2] : Other than as documented here and in the HPI, the thirteen point ROS is negative

## 2024-04-18 NOTE — REASON FOR VISIT
[FreeTextEntry1] : CLAUDIA STANFORD is a 79 year-old M presents here for cardiac evaluation .   His cardiac history includes:  1. CABG x 3 in 2014.  2. Hypertension.  3. Hyperlipidemia.  Further medical history includes:  1. GERD.  2. Prostate cancer.  3. Ulcerative colitis.  4. Bariatric surgery.  5. PMR.  6. CKD  Acceleration of his hypertension was a significant issue through much of this year but now has normalized.  5/30/23 to 6/2/23. Hospitalization in [place] with acute onset of nausea vertigo and vomiting. Initially hypertensive and hypokalemic (potassium-2.2) and creatinine increased to 2.4 QTc 700 and sotalol was discontinued. He had troponin elevations.   Cardiac catheterization: LMCA: Normal LAD: Proximal-80% with competitive flow Circumflex: No disease OM1: 70% RCA: Sequential 50% lesions Posterolateral branch 50% SVG OM1 patent LIMA to LAD patent (History of CABG x2 5/28/14)  After discharge from hospitalization in [place], patient reports 1 episode of dark stools. He had shortness of breath with Hgb of 7.6 and advised 6/9/23  to go to [facility]..  Transfused 1 unit of PRBC. Hgb increased from 7.1 to 8.2. GI was consulted. He's had a recent EGD and colonoscopy that was normal and FOBT was negative, therefor,  colonoscopy was deferred. He has since undergone EGD and small bowel pill enteroscopy which were reportedly negative. He has commence with iron replacement therapy and generally speaking feeling a little bit better. He had mild renal insufficiency and has followed up there.

## 2024-04-18 NOTE — ASSESSMENT
[FreeTextEntry1] : ECG: Sinus bradycardia at 46 bpm First-degree AV block, with leftward axis deviation and nonspecific T wave abnormalities.  LAB DATA ----12/2/20---5/15/21---1/4/22--2/24/22--12/5/22--4/20/23--6/19/23---7/1/23--11/2/23--4/4/24 Chol--155-----155-------147------NA--------165-----136----------------------------------137------131 HDL---47------46---------44-------NA---------41------68-------------------------------------44-------41 LDL---79-------86--------80-------NA---------94------42-------------------------------------59--------42 A1C--6.1-------------------------------------------------------------------------------------------6.8-------6.7 Creat.0.92---------------1.12-----1.29-----------------1.27-------1.57-----1.53----------1.28------1.45 2/24/2022: Aldosterone, metanephrines, normetanephrine, plasma renin all within normal limits  Lab data 9/22/2021: BUN 29/creatinine 1.33 increased from 0.94 since adding losartan 50 and HCTZ 25 mg  Cardiac catheterization 6/1/2023 ([place]) LMCA: Normal LAD: Proximal-80% with competitive flow Circumflex: No disease OM1: 70% RCA: Sequential 50% lesions Posterolateral branch 50% SVG OM1 patent LIMA to LAD patent (History of CABG x2    5/28/14)  Portable Monitor Sleep Study: (3/31/22) MURALI (surrogate for AHI): 16.8 OAI 2.5 ANGELINA 0.8 Lowest Desat 73  Renal artery duplex 2/24/2022: There is no evidence of renal artery stenosis.  Echocardiogram 9/16/2021: Normal LV size and function ejection fraction 60 to 65% Mild left atrial dilatation No significant valvular disease.  Venous duplex lower extremity 9/23/2021: No evidence of DVT bilaterally  2/24/2022: Renal artery Doppler No evidence of renal artery stenosis  extremity arterial duplex 9/16/2021: No evidence of any significant obstructive PAD.  Carotid duplex 12/22/2022 Left: Mild bulb: Moderate ICA: Peak 101 cm/s: 1 to 49% Right: Mild CCA bulb, ICA: Peak 81 cm/s: 1 to 41%  Carotid study 10/30/20 Mild plaque in the ICA and prox. ICA Moderate plaque in the left ECA Mild plaque in the distal right CCA Mild plaque in the right bulb Mild plaque in the right prox ICA  Carotid study 9/20/18: Mild bilateral atherosclerotic plaquing. No hemodynamically significant stenosis. Left slightly worse than right.  Pharmacologic stress 1/19/2023: Normal SPECT imaging. No evidence of ischemia. Unchanged from prior study 2018  Pharm. stress test 10/26/20 Peal HR 68 bpm Peak /70 Pcc. PACs, ECG negative for ischemia. Normal SPECT. EF 62%  Echocardiogram 9/24/18: LVH with hyperdynamic left ventricular function. Ejection fraction greater than 70%. Mild subaortic outflow tract obstruction. Mild mitral and tricuspid insufficiency which appears less than on the prior echocardiogram.  IMPRESSION:  1. Status post possible non-ST elevation MI 5/30/2023, hypokalemia and BRUNILDA.  Cardiac catheterization --  patent grafts.  Had melena and shortness of breath post hospital discharge>> Hgb = 7.6 and hospitalized at  [facility] 6/9/2023. Received 1 unit PRBC. SOB now improved most recent hemoglobin 13. GI work-up - Negative EGD and Small bowel Capsule study.  2. Benign essential hypertension, had been difficult to control now seems to be well controlled. Renal artery stenosis and other causes of secondary hypertension seems to be excluded.  3. Hypokalemia possibly was due to hydrochlorothiazide and/or the vomiting. Sotalol was stopped due to QTc  prolongation in the face of hypokalemia. Patient restarted Sotalol on his own. QTc 443 msec few weeks ago  now 407 ms.  4.stable mild bilateral atherosclerotic carotid disease 2022 -- no obstruction. No evidence of any significant lower extremity PAD and the burning in his calf muscles has resolved.  5. Rare clinical recurrence of palps c/w his hx of paroxysmal atrial fibrillation on low dose sotalol. Tolerating Xarelto and compliant with this. No clinical indication for Plavix at this time therefor was discontinued.  6. Hyperlipidemia with marked improvement in LDL from 94-68 - 59--43 with change to rosuvastatin 20.  7. Obstructive sleep apnea- patient cannot tolerate the mask and is electing to live without it understanding the consequences.  8.  A1c consistent with diabetes mellitus.  9.  Progression of bradycardia of which the patient states he is asymptomatic.  Plan: 1.  Regular exercise stress test to assess for chronotropic competence.  2. Maintain HCTZ QOD. Follow-up metabolic panel to reassess renal function. Nephrology lucian NUGENT.  3. Choosing not to use CPAP for his obstructive sleep apnea.  4. Continue rosuvastatin 20 mg daily repeat blood work in 3 months.  5. Continue Xarelto for thromboembolic ppx given PAF.  6. Advised to eat foods rich in potassium and iron.   7.  Must regroup with Dr. Nuvia Lyon re: the A1c 6.8.  Clinical follow up in 4 months.

## 2024-04-25 ENCOUNTER — OFFICE (OUTPATIENT)
Dept: URBAN - METROPOLITAN AREA CLINIC 94 | Facility: CLINIC | Age: 79
Setting detail: OPHTHALMOLOGY
End: 2024-04-25
Payer: MEDICARE

## 2024-04-25 DIAGNOSIS — H35.3124: ICD-10-CM

## 2024-04-25 DIAGNOSIS — H35.3211: ICD-10-CM

## 2024-04-25 PROCEDURE — 92134 CPTRZ OPH DX IMG PST SGM RTA: CPT | Performed by: SPECIALIST

## 2024-04-25 PROCEDURE — 92014 COMPRE OPH EXAM EST PT 1/>: CPT | Performed by: SPECIALIST

## 2024-07-11 ENCOUNTER — APPOINTMENT (OUTPATIENT)
Dept: CARDIOLOGY | Facility: CLINIC | Age: 79
End: 2024-07-11
Payer: COMMERCIAL

## 2024-07-11 PROCEDURE — 93015 CV STRESS TEST SUPVJ I&R: CPT

## 2024-08-08 ENCOUNTER — APPOINTMENT (OUTPATIENT)
Dept: CARDIOLOGY | Facility: CLINIC | Age: 79
End: 2024-08-08

## 2024-08-08 PROCEDURE — 99214 OFFICE O/P EST MOD 30 MIN: CPT

## 2024-08-08 PROCEDURE — G2211 COMPLEX E/M VISIT ADD ON: CPT

## 2024-08-08 NOTE — REASON FOR VISIT
[FreeTextEntry1] : CLAUDIA STANFORD is a 79 year-old M presents here for cardiac evaluation .   His cardiac history includes:  1. CABG x 3 in 2014.  2. Hypertension.  3. Hyperlipidemia.  Further medical history includes:  1. GERD.  2. Prostate cancer.  3. Ulcerative colitis.  4. Bariatric surgery.  5. PMR.  6. CKD  Acceleration of his hypertension was a significant issue through much of this year but now has normalized.  5/30/23 to 6/2/23. Hospitalization in [place] with acute onset of nausea vertigo and vomiting. Initially hypertensive and hypokalemic (potassium-2.2) and creatinine increased to 2.4 QTc 700 and sotalol was discontinued. He had troponin elevations.   Cardiac catheterization: LMCA: Normal LAD: Proximal-80% with competitive flow Circumflex: No disease OM1: 70% RCA: Sequential 50% lesions Posterolateral branch 50% SVG OM1 patent LIMA to LAD patent (History of CABG x2 5/28/14)  After discharge from hospitalization in [place], patient reports 1 episode of dark stools. He had shortness of breath with Hgb of 7.6 and advised 6/9/23  to go to [facility]..  Transfused 1 unit of PRBC. Hgb increased from 7.1 to 8.2. GI was consulted. He's had EGD and colonoscopy that was normal and FOBT was negative, therefore,  colonoscopy was deferred. He has since undergone EGD and small bowel pill enteroscopy which were reportedly negative. With iron replacement therapy he has improved. He had mild renal insufficiency being followed by nephrology.

## 2024-08-08 NOTE — ASSESSMENT
[FreeTextEntry1] : ECG: Sinus bradycardia at 46 bpm First-degree AV block, with leftward axis deviation and nonspecific T wave abnormalities.  LAB DATA ----12/2/20---5/15/21---1/4/22--2/24/22--12/5/22--4/20/23--6/19/23---7/1/23--11/2/23--4/4/24 Chol--155-----155-------147------NA--------165-----136----------------------------------137------131 HDL---47------46---------44-------NA---------41------68-------------------------------------44-------41 LDL---79-------86--------80-------NA---------94------42-------------------------------------59--------42 A1C--6.1-------------------------------------------------------------------------------------------6.8-------6.7 Creat.0.92---------------1.12-----1.29-----------------1.27-------1.57-----1.53----------1.28------1.45 2/24/2022: Aldosterone, metanephrines, normetanephrine, plasma renin all within normal limits  Lab data 9/22/2021: BUN 29/creatinine 1.33 increased from 0.94 since adding losartan 50 and HCTZ 25 mg  Cardiac catheterization 6/1/2023 ([place]) LMCA: Normal LAD: Proximal-80% with competitive flow Circumflex: No disease OM1: 70% RCA: Sequential 50% lesions Posterolateral branch 50% SVG OM1 patent LIMA to LAD patent (History of CABG x2    5/28/14)  Portable Monitor Sleep Study: (3/31/22) MURALI (surrogate for AHI): 16.8 OAI 2.5 ANGELINA 0.8 Lowest Desat 73  Renal artery duplex 2/24/2022: There is no evidence of renal artery stenosis.  Echocardiogram 9/16/2021: Normal LV size and function ejection fraction 60 to 65% Mild left atrial dilatation No significant valvular disease.  Venous duplex lower extremity 9/23/2021: No evidence of DVT bilaterally  2/24/2022: Renal artery Doppler No evidence of renal artery stenosis  extremity arterial duplex 9/16/2021: No evidence of any significant obstructive PAD.  Carotid duplex 12/22/2022 Left: Mild bulb: Moderate ICA: Peak 101 cm/s: 1 to 49% Right: Mild CCA bulb, ICA: Peak 81 cm/s: 1 to 41%  Carotid study 10/30/20 Mild plaque in the ICA and prox. ICA Moderate plaque in the left ECA Mild plaque in the distal right CCA Mild plaque in the right bulb Mild plaque in the right prox ICA  Carotid study 9/20/18: Mild bilateral atherosclerotic plaquing. No hemodynamically significant stenosis. Left slightly worse than right.  Echocardiogram 9/24/18: LVH with hyperdynamic left ventricular function. Ejection fraction greater than 70%. Mild subaortic outflow tract obstruction. Mild mitral and tricuspid insufficiency which appears less than on the prior echocardiogram.  Stress test 7/11/2024 modified [place] protocol Time: 9 minutes 30 seconds (4 METS) Heart rate: 101 (71%) submaximal.  ECG nondiagnostic.   Pharm. stress test 10/26/20 Peal HR 68 bpm  Pharmacologic stress 1/19/2023: Normal SPECT imaging. No evidence of ischemia. Unchanged from prior study 2018 Peak /70 Pcc. PACs, ECG negative for ischemia. Normal SPECT. EF 62%  IMPRESSION:  1. Status post possible non-ST elevation MI 5/30/2023, hypokalemia and BRUNILDA.  Cardiac catheterization --  patent grafts.  Had melena and shortness of breath post hospital discharge>> Hgb = 7.6 and hospitalized at  [facility] 6/9/2023. Received 1 unit PRBC. SOB now improved most recent hemoglobin 13. GI work-up - Negative EGD and Small bowel Capsule study.  2. Benign essential hypertension, had been difficult to control now seems to be well controlled. Renal artery stenosis and other causes of secondary hypertension seems to be excluded.  3. Hypokalemia possibly was due to hydrochlorothiazide and/or the vomiting. Sotalol was stopped due to QTc  prolongation in the face of hypokalemia. Patient restarted Sotalol on his own. QTc 443 msec few weeks ago  now 407 ms.  4.stable mild bilateral atherosclerotic carotid disease 2022 -- no obstruction. No evidence of any significant lower extremity PAD and the burning in his calf muscles has resolved.  5. Rare clinical recurrence of palps c/w his hx of paroxysmal atrial fibrillation on low dose sotalol. Tolerating Xarelto and compliant with this. No clinical indication for Plavix at this time therefor was discontinued.  6. Hyperlipidemia with marked improvement in LDL from 94-68 - 59--43 with change to rosuvastatin 20.  7. Obstructive sleep apnea- patient cannot tolerate the mask and is electing to live without it understanding the consequences.  8.  A1c consistent with diabetes mellitus.  9.  Stress testing demonstrating evidence of chronotropic incompetence.  Nondiagnostic in other terms  Plan: 1.  We will discontinue low-dose carvedilol and see how patient's heart rate response.  He will need to monitor blood pressure carefully and report if it starts trending upwards.  2. Maintain HCTZ QOD. Follow-up metabolic panel to reassess renal function. Nephrology eval TBORTIZ.  3. Choosing not to use CPAP for his obstructive sleep apnea.  4. Continue rosuvastatin 20 mg daily repeat blood work in 3 months.  5. Continue Xarelto for thromboembolic ppx given PAF.  6. Advised to eat foods rich in potassium and iron.   7.  Noninvasive testing to include an echocardiogram and a carotid duplex have been ordered.  Clinical follow up in 4 months.

## 2024-10-17 ENCOUNTER — NON-APPOINTMENT (OUTPATIENT)
Age: 79
End: 2024-10-17

## 2024-10-17 VITALS — BODY MASS INDEX: 31.47 KG/M2 | WEIGHT: 171 LBS | HEIGHT: 62 IN

## 2024-10-17 DIAGNOSIS — Z87.891 PERSONAL HISTORY OF NICOTINE DEPENDENCE: ICD-10-CM

## 2024-10-26 ENCOUNTER — OUTPATIENT (OUTPATIENT)
Dept: OUTPATIENT SERVICES | Facility: HOSPITAL | Age: 79
LOS: 1 days | End: 2024-10-26
Payer: MEDICARE

## 2024-10-26 ENCOUNTER — APPOINTMENT (OUTPATIENT)
Dept: CT IMAGING | Facility: CLINIC | Age: 79
End: 2024-10-26

## 2024-10-26 DIAGNOSIS — Z12.2 ENCOUNTER FOR SCREENING FOR MALIGNANT NEOPLASM OF RESPIRATORY ORGANS: ICD-10-CM

## 2024-10-26 PROCEDURE — 71271 CT THORAX LUNG CANCER SCR C-: CPT | Mod: 26,MH

## 2024-11-02 ENCOUNTER — OFFICE (OUTPATIENT)
Dept: URBAN - METROPOLITAN AREA CLINIC 94 | Facility: CLINIC | Age: 79
Setting detail: OPHTHALMOLOGY
End: 2024-11-02
Payer: MEDICARE

## 2024-11-02 DIAGNOSIS — H35.3211: ICD-10-CM

## 2024-11-02 DIAGNOSIS — H35.3124: ICD-10-CM

## 2024-11-02 DIAGNOSIS — H52.213: ICD-10-CM

## 2024-11-02 PROCEDURE — 92250 FUNDUS PHOTOGRAPHY W/I&R: CPT | Performed by: SPECIALIST

## 2024-11-02 PROCEDURE — 92014 COMPRE OPH EXAM EST PT 1/>: CPT | Performed by: SPECIALIST

## 2024-11-02 ASSESSMENT — VISUAL ACUITY
OS_BCVA: 20/60
OD_BCVA: 20/30-1

## 2024-11-02 ASSESSMENT — REFRACTION_AUTOREFRACTION
OD_AXIS: 180
OD_SPHERE: UTP
OD_CYLINDER: -1.75
OS_AXIS: 082
OS_SPHERE: +3.50
OS_CYLINDER: -2.75

## 2024-11-02 ASSESSMENT — CORNEAL EDEMA CLINICAL DESCRIPTION: OD_CORNEALEDEMA: T OVER THE WOUND

## 2024-11-02 ASSESSMENT — KERATOMETRY
OS_AXISANGLE_DEGREES: 003
OD_AXISANGLE_DEGREES: 072
OD_K1POWER_DIOPTERS: 37.84
OD_K2POWER_DIOPTERS: 42.72
OS_K1POWER_DIOPTERS: 38.62
OS_K2POWER_DIOPTERS: 40.52

## 2024-11-02 ASSESSMENT — CONFRONTATIONAL VISUAL FIELD TEST (CVF)
OD_FINDINGS: FULL
OS_FINDINGS: FULL

## 2024-11-02 ASSESSMENT — TONOMETRY
OS_IOP_MMHG: 14
OD_IOP_MMHG: 15

## 2024-11-07 ENCOUNTER — APPOINTMENT (OUTPATIENT)
Dept: CARDIOLOGY | Facility: CLINIC | Age: 79
End: 2024-11-07
Payer: COMMERCIAL

## 2024-11-07 PROCEDURE — 93306 TTE W/DOPPLER COMPLETE: CPT

## 2024-11-07 PROCEDURE — 93880 EXTRACRANIAL BILAT STUDY: CPT

## 2024-11-12 RX ORDER — APIXABAN 5 MG/1
5 TABLET, FILM COATED ORAL
Qty: 180 | Refills: 0 | Status: ACTIVE | COMMUNITY
Start: 1900-01-01 | End: 1900-01-01

## 2024-11-15 ENCOUNTER — NON-APPOINTMENT (OUTPATIENT)
Age: 79
End: 2024-11-15

## 2024-12-12 ENCOUNTER — NON-APPOINTMENT (OUTPATIENT)
Age: 79
End: 2024-12-12

## 2024-12-12 ENCOUNTER — APPOINTMENT (OUTPATIENT)
Dept: CARDIOLOGY | Facility: CLINIC | Age: 79
End: 2024-12-12
Payer: COMMERCIAL

## 2024-12-12 VITALS
WEIGHT: 171 LBS | HEIGHT: 64 IN | SYSTOLIC BLOOD PRESSURE: 116 MMHG | DIASTOLIC BLOOD PRESSURE: 64 MMHG | BODY MASS INDEX: 29.19 KG/M2 | HEART RATE: 55 BPM

## 2024-12-12 DIAGNOSIS — E78.5 HYPERLIPIDEMIA, UNSPECIFIED: ICD-10-CM

## 2024-12-12 DIAGNOSIS — I10 ESSENTIAL (PRIMARY) HYPERTENSION: ICD-10-CM

## 2024-12-12 DIAGNOSIS — G47.33 OBSTRUCTIVE SLEEP APNEA (ADULT) (PEDIATRIC): ICD-10-CM

## 2024-12-12 DIAGNOSIS — I48.0 PAROXYSMAL ATRIAL FIBRILLATION: ICD-10-CM

## 2024-12-12 DIAGNOSIS — I25.10 ATHEROSCLEROTIC HEART DISEASE OF NATIVE CORONARY ARTERY W/OUT ANGINA PECTORIS: ICD-10-CM

## 2024-12-12 PROCEDURE — G2211 COMPLEX E/M VISIT ADD ON: CPT | Mod: NC

## 2024-12-12 PROCEDURE — 99214 OFFICE O/P EST MOD 30 MIN: CPT

## 2024-12-12 PROCEDURE — 93000 ELECTROCARDIOGRAM COMPLETE: CPT

## 2025-05-03 ENCOUNTER — OFFICE (OUTPATIENT)
Dept: URBAN - METROPOLITAN AREA CLINIC 94 | Facility: CLINIC | Age: 80
Setting detail: OPHTHALMOLOGY
End: 2025-05-03
Payer: MEDICARE

## 2025-05-03 DIAGNOSIS — H35.3211: ICD-10-CM

## 2025-05-03 DIAGNOSIS — H35.3124: ICD-10-CM

## 2025-05-03 DIAGNOSIS — H52.213: ICD-10-CM

## 2025-05-03 PROCEDURE — 92014 COMPRE OPH EXAM EST PT 1/>: CPT | Performed by: SPECIALIST

## 2025-05-03 PROCEDURE — 92235 FLUORESCEIN ANGRPH MLTIFRAME: CPT | Performed by: SPECIALIST

## 2025-05-03 PROCEDURE — 92134 CPTRZ OPH DX IMG PST SGM RTA: CPT | Performed by: SPECIALIST

## 2025-05-03 ASSESSMENT — KERATOMETRY
OD_K2POWER_DIOPTERS: 42.72
OD_AXISANGLE_DEGREES: 072
OD_K1POWER_DIOPTERS: 37.84
OS_K2POWER_DIOPTERS: 40.52
OS_AXISANGLE_DEGREES: 003
OS_K1POWER_DIOPTERS: 38.62

## 2025-05-03 ASSESSMENT — CORNEAL EDEMA CLINICAL DESCRIPTION: OD_CORNEALEDEMA: T OVER THE WOUND

## 2025-05-03 ASSESSMENT — CONFRONTATIONAL VISUAL FIELD TEST (CVF)
OD_FINDINGS: FULL
OS_FINDINGS: FULL

## 2025-05-03 ASSESSMENT — REFRACTION_AUTOREFRACTION
OS_AXIS: 082
OD_AXIS: 180
OD_SPHERE: UTP
OS_SPHERE: +3.50
OD_CYLINDER: -1.75
OS_CYLINDER: -2.75

## 2025-05-03 ASSESSMENT — VISUAL ACUITY
OS_BCVA: 20/60
OD_BCVA: 20/40

## 2025-05-03 ASSESSMENT — TONOMETRY
OD_IOP_MMHG: 13
OS_IOP_MMHG: 16

## 2025-06-12 ENCOUNTER — APPOINTMENT (OUTPATIENT)
Dept: CARDIOLOGY | Facility: CLINIC | Age: 80
End: 2025-06-12
Payer: COMMERCIAL

## 2025-06-12 VITALS
DIASTOLIC BLOOD PRESSURE: 59 MMHG | SYSTOLIC BLOOD PRESSURE: 99 MMHG | BODY MASS INDEX: 28.34 KG/M2 | HEIGHT: 64 IN | RESPIRATION RATE: 18 BRPM | OXYGEN SATURATION: 94 % | HEART RATE: 50 BPM | WEIGHT: 166 LBS

## 2025-06-12 PROBLEM — R06.09 DYSPNEA ON EXERTION: Status: ACTIVE | Noted: 2025-06-12

## 2025-06-12 PROCEDURE — 93000 ELECTROCARDIOGRAM COMPLETE: CPT

## 2025-06-12 PROCEDURE — 99214 OFFICE O/P EST MOD 30 MIN: CPT

## 2025-06-12 PROCEDURE — G2211 COMPLEX E/M VISIT ADD ON: CPT | Mod: NC

## 2025-06-12 RX ORDER — LOSARTAN POTASSIUM 25 MG/1
25 TABLET, FILM COATED ORAL
Qty: 90 | Refills: 3 | Status: ACTIVE | COMMUNITY
Start: 2025-06-12 | End: 1900-01-01

## 2025-06-12 RX ORDER — EMPAGLIFLOZIN 25 MG/1
TABLET, FILM COATED ORAL
Refills: 0 | Status: ACTIVE | COMMUNITY

## 2025-08-05 ENCOUNTER — NON-APPOINTMENT (OUTPATIENT)
Age: 80
End: 2025-08-05

## 2025-08-07 ENCOUNTER — APPOINTMENT (OUTPATIENT)
Dept: CARDIOLOGY | Facility: CLINIC | Age: 80
End: 2025-08-07
Payer: MEDICARE

## 2025-08-07 PROCEDURE — 93015 CV STRESS TEST SUPVJ I&R: CPT

## 2025-08-07 PROCEDURE — A9500: CPT

## 2025-08-07 PROCEDURE — 78452 HT MUSCLE IMAGE SPECT MULT: CPT

## 2025-08-07 RX ORDER — REGADENOSON 0.08 MG/ML
0.4 INJECTION, SOLUTION INTRAVENOUS
Refills: 0 | Status: COMPLETED | OUTPATIENT
Start: 2025-08-07

## 2025-08-07 RX ADMIN — REGADENOSON 0 MG/5ML: 0.08 INJECTION, SOLUTION INTRAVENOUS at 00:00

## 2025-08-21 ENCOUNTER — APPOINTMENT (OUTPATIENT)
Dept: CARDIOLOGY | Facility: CLINIC | Age: 80
End: 2025-08-21
Payer: MEDICARE

## 2025-08-21 VITALS
BODY MASS INDEX: 29.37 KG/M2 | HEIGHT: 64 IN | DIASTOLIC BLOOD PRESSURE: 60 MMHG | RESPIRATION RATE: 18 BRPM | HEART RATE: 52 BPM | SYSTOLIC BLOOD PRESSURE: 90 MMHG | WEIGHT: 172 LBS | OXYGEN SATURATION: 98 %

## 2025-08-21 DIAGNOSIS — I48.0 PAROXYSMAL ATRIAL FIBRILLATION: ICD-10-CM

## 2025-08-21 DIAGNOSIS — I25.10 ATHEROSCLEROTIC HEART DISEASE OF NATIVE CORONARY ARTERY W/OUT ANGINA PECTORIS: ICD-10-CM

## 2025-08-21 DIAGNOSIS — E11.9 TYPE 2 DIABETES MELLITUS W/OUT COMPLICATIONS: ICD-10-CM

## 2025-08-21 DIAGNOSIS — I10 ESSENTIAL (PRIMARY) HYPERTENSION: ICD-10-CM

## 2025-08-21 DIAGNOSIS — E78.5 HYPERLIPIDEMIA, UNSPECIFIED: ICD-10-CM

## 2025-08-21 DIAGNOSIS — G47.33 OBSTRUCTIVE SLEEP APNEA (ADULT) (PEDIATRIC): ICD-10-CM

## 2025-08-21 PROCEDURE — G2211 COMPLEX E/M VISIT ADD ON: CPT

## 2025-08-21 PROCEDURE — 99214 OFFICE O/P EST MOD 30 MIN: CPT

## 2025-08-21 RX ORDER — NIFEDIPINE 60 MG/1
60 TABLET, EXTENDED RELEASE ORAL
Refills: 3 | Status: ACTIVE | COMMUNITY

## 2025-08-27 ENCOUNTER — APPOINTMENT (OUTPATIENT)
Dept: PULMONOLOGY | Facility: CLINIC | Age: 80
End: 2025-08-27
Payer: MEDICARE

## 2025-08-27 VITALS
HEART RATE: 60 BPM | OXYGEN SATURATION: 99 % | SYSTOLIC BLOOD PRESSURE: 130 MMHG | RESPIRATION RATE: 16 BRPM | DIASTOLIC BLOOD PRESSURE: 80 MMHG

## 2025-08-27 VITALS — HEIGHT: 64 IN | BODY MASS INDEX: 28.68 KG/M2 | WEIGHT: 168 LBS

## 2025-08-27 DIAGNOSIS — F17.200 NICOTINE DEPENDENCE, UNSPECIFIED, UNCOMPLICATED: ICD-10-CM

## 2025-08-27 DIAGNOSIS — Z87.891 PERSONAL HISTORY OF NICOTINE DEPENDENCE: ICD-10-CM

## 2025-08-27 DIAGNOSIS — R06.09 OTHER FORMS OF DYSPNEA: ICD-10-CM

## 2025-08-27 DIAGNOSIS — R06.02 SHORTNESS OF BREATH: ICD-10-CM

## 2025-08-27 DIAGNOSIS — E66.9 OBESITY, UNSPECIFIED: ICD-10-CM

## 2025-08-27 PROCEDURE — 99406 BEHAV CHNG SMOKING 3-10 MIN: CPT

## 2025-08-27 PROCEDURE — 99215 OFFICE O/P EST HI 40 MIN: CPT | Mod: 25

## 2025-08-27 PROCEDURE — G2211 COMPLEX E/M VISIT ADD ON: CPT

## 2025-08-28 ENCOUNTER — APPOINTMENT (OUTPATIENT)
Dept: PULMONOLOGY | Facility: CLINIC | Age: 80
End: 2025-08-28

## 2025-09-05 ENCOUNTER — APPOINTMENT (OUTPATIENT)
Dept: PAIN MANAGEMENT | Facility: CLINIC | Age: 80
End: 2025-09-05
Payer: MEDICARE

## 2025-09-05 VITALS — HEIGHT: 64 IN | WEIGHT: 171 LBS | BODY MASS INDEX: 29.19 KG/M2

## 2025-09-05 DIAGNOSIS — M54.16 RADICULOPATHY, LUMBAR REGION: ICD-10-CM

## 2025-09-05 PROCEDURE — 99204 OFFICE O/P NEW MOD 45 MIN: CPT

## 2025-09-05 PROCEDURE — G2211 COMPLEX E/M VISIT ADD ON: CPT

## 2025-09-09 ENCOUNTER — NON-APPOINTMENT (OUTPATIENT)
Age: 80
End: 2025-09-09

## 2025-09-18 ENCOUNTER — APPOINTMENT (OUTPATIENT)
Dept: PAIN MANAGEMENT | Facility: CLINIC | Age: 80
End: 2025-09-18
Payer: MEDICARE

## 2025-09-18 DIAGNOSIS — M54.16 RADICULOPATHY, LUMBAR REGION: ICD-10-CM

## 2025-09-18 PROCEDURE — 82948 REAGENT STRIP/BLOOD GLUCOSE: CPT

## 2025-09-18 PROCEDURE — 62323 NJX INTERLAMINAR LMBR/SAC: CPT

## 2025-09-25 PROBLEM — J44.9 COPD, MILD: Status: ACTIVE | Noted: 2025-09-25
